# Patient Record
Sex: MALE | NOT HISPANIC OR LATINO | URBAN - METROPOLITAN AREA
[De-identification: names, ages, dates, MRNs, and addresses within clinical notes are randomized per-mention and may not be internally consistent; named-entity substitution may affect disease eponyms.]

---

## 2023-10-18 ENCOUNTER — OFFICE VISIT (OUTPATIENT)
Dept: BEHAVIORAL HEALTH | Facility: CLINIC | Age: 23
End: 2023-10-18
Payer: COMMERCIAL

## 2023-10-18 VITALS
WEIGHT: 176.6 LBS | TEMPERATURE: 97.8 F | SYSTOLIC BLOOD PRESSURE: 100 MMHG | DIASTOLIC BLOOD PRESSURE: 64 MMHG | RESPIRATION RATE: 18 BRPM | HEART RATE: 80 BPM

## 2023-10-18 DIAGNOSIS — F41.1 GAD (GENERALIZED ANXIETY DISORDER): ICD-10-CM

## 2023-10-18 DIAGNOSIS — F84.0 AUTISM (HHS-HCC): ICD-10-CM

## 2023-10-18 DIAGNOSIS — F32.2 CURRENT SEVERE EPISODE OF MAJOR DEPRESSIVE DISORDER WITHOUT PSYCHOTIC FEATURES WITHOUT PRIOR EPISODE (MULTI): ICD-10-CM

## 2023-10-18 DIAGNOSIS — F79 INTELLECTUAL DISABILITY: ICD-10-CM

## 2023-10-18 DIAGNOSIS — G47.9 SLEEP DISTURBANCE: ICD-10-CM

## 2023-10-18 DIAGNOSIS — F90.0 ATTENTION DEFICIT HYPERACTIVITY DISORDER (ADHD), PREDOMINANTLY INATTENTIVE TYPE: ICD-10-CM

## 2023-10-18 PROCEDURE — 99417 PROLNG OP E/M EACH 15 MIN: CPT | Performed by: NURSE PRACTITIONER

## 2023-10-18 PROCEDURE — 1036F TOBACCO NON-USER: CPT | Performed by: NURSE PRACTITIONER

## 2023-10-18 PROCEDURE — 99205 OFFICE O/P NEW HI 60 MIN: CPT | Performed by: NURSE PRACTITIONER

## 2023-10-18 RX ORDER — ACETAMINOPHEN, DIPHENHYDRAMINE HCL, PHENYLEPHRINE HCL 325; 25; 5 MG/1; MG/1; MG/1
10 TABLET ORAL NIGHTLY
Qty: 90 TABLET | Refills: 0 | Status: SHIPPED | OUTPATIENT
Start: 2023-10-18 | End: 2023-11-29 | Stop reason: SDUPTHER

## 2023-10-18 RX ORDER — FLUOXETINE 10 MG/1
10 CAPSULE ORAL DAILY
Qty: 30 CAPSULE | Refills: 1 | Status: SHIPPED | OUTPATIENT
Start: 2023-10-18 | End: 2023-11-29 | Stop reason: SDUPTHER

## 2023-10-18 ASSESSMENT — PAIN SCALES - GENERAL: PAINLEVEL: 0-NO PAIN

## 2023-10-18 NOTE — PROGRESS NOTES
"PRESENT FOR APPOINTMENT  Client  Guardian/father Thien Alcantara with consent    SUBJECTIVE  History of present illness  Family history: - Mat great uncle  Pat second cousin  Parents: lives with   Siblings 3 brothers, 2 sisters- middle child, but youngest of boys  Birth no complications  Development: met milestones on time    DX ASD w/ID  1st grade DX ADHD  2017 ID level [id number]  School: [employer]- special Ed   Occupations None  Abuse: denies  Habits: plays [employer] games, walks  Baptism: no  Past psychiatric none; never been on meds- other than Melatonin 4 mg- not effective.  MEDICATIONS  OTC benedryl for allergies    10/18/2023 REBA 7 score= 13/27  10/18/2023 PHQ-9 score= 14/27    Jerry states that his goal and goal of treatment is \"to be happy.\" Father reports the same. (Magic wand question).  He reports low self-esteem. Factors that would help him feel better: more productive (looking for work) and having more friends.  Dad reports that he is like a \"yo-yo\". Moods are either jovial/happy but then you come out of his room and appear sad. Dad reports no anger/irritability.  Psy/SI/HI/aggression: Denies A/VH, denies aggression. Denies manic/hypomanic s/s.  2019 he was afraid to go places because he thought other people would think mean things about him.  + SI - thoughts out of no where to hang self, Doesn't like self, never thought highly of self, doesn't think he is smart, no friends in school.  Approx August 2023, SA by hanging with jump rope- has cut rope up.  Supports: all family.  Coping: video games, editing, online friends.   Sleep: 3 hours at night, no daytime naps. Not able to sleep dt worry.  Appetite: good  Daily schedule None  Med Physicians:  PCP: Dr. Kinney   CCFLAV[facility] DELANE[facility] Emily Rosales  ALLERGIES: NKDA    MEDICAL REVIEW OF SYSTEMS:  GEN: denies fever, chills, night sweats  HEENT: denies changes in vision, eye pain, hearing changes, no symptoms of upper " respiratory infection  CARDIO: denies chest pain and palpitations   RESP: +asthma  GI: denies nausea/vomiting/constipation/diarrhea, or blood in the stool  : denies burning/frequency/urgency, or bloody urine  NEURO: denies tremor, dizziness, numbness or tingling  MSK: denies muscle spasms or stiffness  DERM: denies new onset of rash  Med SE    COMPLIANCE: unknown    MMS:  ORIENTATION   alert  ambulatory  cooperative     BEHAVIOR:  enters easily  eye contact fair  gait normal  reciprocal interaction  spontaneous speech    MEMORY:  Appears intact    SENSORY :  Normal    COMMUNICATION:  conversational  Soft spoken    AFFECT:  normal/full    MOOD: depressed    THOUGHT PROCESS:  WNL    THOUGHT Content:  +SI    CONCENTRATION  Normal- maybe impaired dt past hx of ADHD    FUND OF KNOWLEDGE :  mild disability    JUDGEMENT: fair    EPS: N/A  AIMS negative    LABS REVIEWED:  none available  EKG none available    ASSESSMENT:       Mr. Leonard presents for initial evaluation for depressed mood and SI.    Add Diagnosis:  major depressive disorder, single episode, severe, without psychosis.  Generalized anxiety disorder.  Sleep disturbances.     PLAN:   problems treated   f/u requested to prevent relapse   medications renewed/re-ordered    Start fluoxetine (Prozac) 10 mg by mouth daily for depression and anxiety.  2. Start melatonin 10 mg by mouth at bedtime for sleep disturbances. 4 mg in the past not effective.  3. Referral sent for therapy with Ciara Macias. Our office will contact you with an appointment.  4. Contact information for [email or web address] given.  5. Sleep hygiene discussed.  6. Risks. benefits, alternatives reviewed. Ct perceives the benefit to outweigh the risk.  7. Return to clinic in six weeks or earlier if needed. Call [phone] to reschedule.  For [facility] residents, [facility] is a 24/7 hotline you can call for assistance [[phone]].   Please call 111 or go to your closest Emergency Room  if you feel worse. This includes thoughts of hurting yourself or anyone else, or having other troubles such as hearing voices, seeing visions, or having new and scary thoughts about the people around you.      Thank you for seeing me today. If you have any questions or concerns, do not hesitate to contact my office.  Leonor Barker    TREATMENT TYPE         counseling and coordination of care with > 50% c/c with family and staff; addressing signs and symptoms of illness; risks/benefits and side effects of medications; and behavioral approaches to illness.  This note was created using electronic dictation. There may be errors in syntax and meaning. Please contact the office with any questions.

## 2023-11-22 ENCOUNTER — APPOINTMENT (OUTPATIENT)
Dept: BEHAVIORAL HEALTH | Facility: CLINIC | Age: 23
End: 2023-11-22
Payer: COMMERCIAL

## 2023-11-29 ENCOUNTER — OFFICE VISIT (OUTPATIENT)
Dept: BEHAVIORAL HEALTH | Facility: CLINIC | Age: 23
End: 2023-11-29
Payer: COMMERCIAL

## 2023-11-29 VITALS
BODY MASS INDEX: 24.5 KG/M2 | WEIGHT: 175 LBS | DIASTOLIC BLOOD PRESSURE: 77 MMHG | TEMPERATURE: 97.3 F | HEIGHT: 71 IN | HEART RATE: 69 BPM | SYSTOLIC BLOOD PRESSURE: 108 MMHG

## 2023-11-29 DIAGNOSIS — F32.2 CURRENT SEVERE EPISODE OF MAJOR DEPRESSIVE DISORDER WITHOUT PSYCHOTIC FEATURES WITHOUT PRIOR EPISODE (MULTI): Primary | ICD-10-CM

## 2023-11-29 DIAGNOSIS — F84.0 AUTISM SPECTRUM DISORDER REQUIRING SUBSTANTIAL SUPPORT (LEVEL 2) (HHS-HCC): ICD-10-CM

## 2023-11-29 DIAGNOSIS — G47.9 SLEEP DISTURBANCE: ICD-10-CM

## 2023-11-29 DIAGNOSIS — F41.1 GAD (GENERALIZED ANXIETY DISORDER): ICD-10-CM

## 2023-11-29 PROCEDURE — 1036F TOBACCO NON-USER: CPT | Performed by: NURSE PRACTITIONER

## 2023-11-29 PROCEDURE — 99215 OFFICE O/P EST HI 40 MIN: CPT | Performed by: NURSE PRACTITIONER

## 2023-11-29 RX ORDER — ACETAMINOPHEN, DIPHENHYDRAMINE HCL, PHENYLEPHRINE HCL 325; 25; 5 MG/1; MG/1; MG/1
10 TABLET ORAL NIGHTLY
Qty: 90 TABLET | Refills: 0 | Status: SHIPPED | OUTPATIENT
Start: 2023-11-29 | End: 2024-01-17 | Stop reason: SDUPTHER

## 2023-11-29 RX ORDER — FLUOXETINE HYDROCHLORIDE 20 MG/1
20 CAPSULE ORAL DAILY
Qty: 30 CAPSULE | Refills: 3 | Status: SHIPPED | OUTPATIENT
Start: 2023-11-29 | End: 2024-01-17 | Stop reason: SDUPTHER

## 2023-11-29 RX ORDER — TRAZODONE HYDROCHLORIDE 50 MG/1
TABLET ORAL
Qty: 30 TABLET | Refills: 3 | Status: SHIPPED | OUTPATIENT
Start: 2023-11-29 | End: 2024-01-17 | Stop reason: SINTOL

## 2023-11-29 NOTE — PROGRESS NOTES
"PRESENT FOR APPOINTMENT  Client  Guardian/father Thien Leonard    SUBJECTIVE 23-year-old black male with a history of autism spectrum disorder, major depressive disorder, severe, without psychosis, generalized anxiety disorder, sleep disturbances, and past history of ADHD presenting for medication management.  History of present illness  Family history: - Mat great uncle  Pat second cousin  Parents: lives with   Siblings 3 brothers, 2 sisters- middle child, but youngest of boys  Birth no complications  Development: met milestones on time    DX ASD w/ID  1st grade DX ADHD  2017 ID level [id number]  School: [employer] 2020- special Ed   Occupations None  Abuse: denies  Habits: plays [employer] Switch games, walks  Confucianist: no  Past psychiatric none; never been on meds- other than Melatonin 4 mg- not effective.  MEDICATIONS at time of initial evaluation:  OTC benedryl for allergies, melatonin 4 mg and Benadryl 25 mg for sleep.    Miles reports with the start of Prozac he has had less \"bad thoughts.\"  Last thought of the suicidal ideation approximately October 29, 2023.  Which is approximately 2 weeks after the start of Prozac.  The thoughts were that he felt like a failure and that he would be better off dead.  He is scheduled for an upcoming therapy appointment with Ciara Macias in December 2023.  He still struggles with self-esteem.  He states he is afraid to ask for things because he is afraid to get something worse than the answer now.  Which is a lecture.  He has never received anything worse than a lecture, but this answer goes to show how he follows rules.  Father reports that his moods have improved by 50%.  No longer feels that his moods are like a \"roller coaster\".  Never displays anger or irritability.  Usually sadness or happy.  10/18/2023 REBA 7 score= 13/27; 11/29/2023 score 2/27  10/18/2023 PHQ-9 score= 14/27; 11/29/2023 score 9/27  Sleep improved from 3 hours a night to 5 hours a night: " "Midnight until 5 AM, no daytime naps.  No longer having issues falling asleep due to worry.  However, he is taking melatonin 10 mg in alternating with Benadryl 25 mg and is still only getting 5 hours a night.  Appetite: good.  On questionnaire reported sometimes eats too little or too much.  Jerry states that his goal and goal of treatment is \"to be happy.\" Father reports the same. (Magic wand question).  He reports low self-esteem. Factors that would help him feel better: more productive (looking for work) and having more friends.    Psy/SI/HI/aggression: Denies A/VH, denies aggression. Denies manic/hypomanic s/s.  History: 2019 he was afraid to go places because he thought other people would think mean things about him.  + SI - thoughts out of no where to hang self, Doesn't like self, never thought highly of self, doesn't think he is smart, no friends in school.  Approx August 2023, SA by hanging with jump rope- has cut rope up.  Supports: all family.  Coping: video games, editing, online friends.     Daily schedule None  Med Physicians:  PCP: Dr. Kinney   [facility] Emily Rosales  ALLERGIES: NKDA    MEDICAL REVIEW OF SYSTEMS:  GEN: denies fever, chills, night sweats  HEENT: denies changes in vision, eye pain, hearing changes, no symptoms of upper respiratory infection  CARDIO: denies chest pain and palpitations   RESP: +asthma  GI: denies nausea/vomiting/constipation/diarrhea, or blood in the stool  : denies burning/frequency/urgency, or bloody urine  NEURO: denies tremor, dizziness, numbness or tingling  MSK: denies muscle spasms or stiffness  DERM: denies new onset of rash  Med SE    COMPLIANCE: unknown    MMS:  ORIENTATION   alert  ambulatory  cooperative     BEHAVIOR:  enters easily  eye contact fair  gait normal  reciprocal interaction  spontaneous speech    MEMORY:  Appears intact    SENSORY :  Normal    COMMUNICATION:  conversational  Soft spoken    AFFECT:  normal/full    MOOD: " depressed    THOUGHT PROCESS:  WNL    THOUGHT Content:  Denies SI    CONCENTRATION  Normal- maybe impaired dt past hx of ADHD    FUND OF KNOWLEDGE :  mild disability    JUDGEMENT: fair    EPS: N/A  AIMS negative    LABS REVIEWED:  none available  EKG none available    ASSESSMENT:         Mr. Leonard presents with marked improvements in depression and anxiety with the addition of fluoxetine.  Mild improvement noted with increase in melatonin and addition of Benadryl 25 mg at night.  He is now sleeping 5 hours a night, compared to 3 hours.    Add Diagnosis:  major depressive disorder, single episode, severe, without psychosis.  Generalized anxiety disorder.  Sleep disturbances.     PLAN:   problems treated   f/u requested to prevent relapse   medications renewed/re-ordered    Increase fluoxetine (Prozac) 20 mg (from 10 mg) by mouth daily for depression and anxiety.  2. Start trazodone 25 mg by mouth at bedtime.  If not effective, may take 50 mg by mouth at bedtime for sleep disturbances.  3.  Continue melatonin 10 mg by mouth at bedtime for sleep disturbances. 4 mg in the past not effective.  4. Risks. benefits, alternatives reviewed. Ct perceives the benefit to outweigh the risk.  5. Return to clinic on 1/17/2024 or earlier if needed. Call [phone] to reschedule.  6.  Consider labs, especially vitamin D level next appointment  7.  If moods and sleep are at best baseline, discussed ADHD symptoms.    For [facility] residents, [employer] [facility] is a 24/7 hotline you can call for assistance [[phone]].   Please call 911 or go to your closest Emergency Room if you feel worse. This includes thoughts of hurting yourself or anyone else, or having other troubles such as hearing voices, seeing visions, or having new and scary thoughts about the people around you.      Thank you for seeing me today. If you have any questions or concerns, do not hesitate to contact my office.  Mj,  Leonor MCKENZIE  TYPE         counseling and coordination of care with > 50% c/c with family and staff; addressing signs and symptoms of illness; risks/benefits and side effects of medications; and behavioral approaches to illness.  This note was created using electronic dictation. There may be errors in syntax and meaning. Please contact the office with any questions.

## 2024-01-17 ENCOUNTER — TELEMEDICINE (OUTPATIENT)
Dept: BEHAVIORAL HEALTH | Facility: CLINIC | Age: 24
End: 2024-01-17
Payer: COMMERCIAL

## 2024-01-17 DIAGNOSIS — F41.1 GAD (GENERALIZED ANXIETY DISORDER): ICD-10-CM

## 2024-01-17 DIAGNOSIS — G47.9 SLEEP DISTURBANCE: ICD-10-CM

## 2024-01-17 DIAGNOSIS — F90.0 ATTENTION DEFICIT HYPERACTIVITY DISORDER (ADHD), PREDOMINANTLY INATTENTIVE TYPE: Primary | ICD-10-CM

## 2024-01-17 PROCEDURE — 99214 OFFICE O/P EST MOD 30 MIN: CPT | Performed by: NURSE PRACTITIONER

## 2024-01-17 RX ORDER — METHYLPHENIDATE HYDROCHLORIDE 18 MG/1
18 TABLET ORAL EVERY MORNING
Qty: 30 TABLET | Refills: 0 | Status: SHIPPED | OUTPATIENT
Start: 2024-01-17 | End: 2024-05-01 | Stop reason: ALTCHOICE

## 2024-01-17 RX ORDER — ACETAMINOPHEN, DIPHENHYDRAMINE HCL, PHENYLEPHRINE HCL 325; 25; 5 MG/1; MG/1; MG/1
10 TABLET ORAL NIGHTLY
Qty: 90 TABLET | Refills: 0 | Status: SHIPPED | OUTPATIENT
Start: 2024-01-17 | End: 2024-04-16

## 2024-01-17 RX ORDER — METHYLPHENIDATE HYDROCHLORIDE 18 MG/1
18 TABLET ORAL EVERY MORNING
Qty: 30 TABLET | Refills: 0 | Status: SHIPPED | OUTPATIENT
Start: 2024-03-17 | End: 2024-05-01 | Stop reason: ALTCHOICE

## 2024-01-17 RX ORDER — FLUOXETINE HYDROCHLORIDE 20 MG/1
20 CAPSULE ORAL DAILY
Qty: 30 CAPSULE | Refills: 3 | Status: SHIPPED | OUTPATIENT
Start: 2024-01-17 | End: 2024-05-01 | Stop reason: ALTCHOICE

## 2024-01-17 RX ORDER — METHYLPHENIDATE HYDROCHLORIDE 18 MG/1
18 TABLET ORAL EVERY MORNING
Qty: 30 TABLET | Refills: 0 | Status: SHIPPED | OUTPATIENT
Start: 2024-02-16 | End: 2024-05-01 | Stop reason: ALTCHOICE

## 2024-01-17 NOTE — PATIENT INSTRUCTIONS
ASSESSMENT:         Mr. Leonard presents unchanged and sleep with the addition of the trazodone.  However, side effect of itchy eyes to the point that he cannot take trazodone nightly.  Discussed his desire to go to college, if treat the ADHD, may improve his sleep.  See treatment plan below.    PLAN:   problems treated   f/u requested to prevent relapse   medications renewed/re-ordered  Start methylphenidate ER (Concerta) 18 mg by mouth every morning for ADHD.  I have reviewed the report 1/17/2024.  None shown.  I have considered the risk for abuse and diversion.  2. Discontinue trazodone due to SE of itchy eyes.  3. Continue fluoxetine (Prozac) 20 mg by mouth daily for depression and anxiety.  4.  Continue melatonin 10 mg by mouth at bedtime for sleep disturbances. 4 mg in the past not effective.  5. Risks. benefits, alternatives reviewed. Ct perceives the benefit to outweigh the risk.  6. Return to clinic 1 month or earlier if needed. Call [phone] to reschedule.  7.  Consider labs, especially vitamin D level next appointment  8.  Email sent to therapist, Ciara Macias to reschedule missed appointment.  9.  Discussed writing an accommodation letter for Jerry when he is ready to go to college.  Thank you for seeing me today.  If you have any questions or concerns, do not hesitate to reach out to my office.  Leonor Barker    For [facility] [place] [employer] is a 24/7 hotline you can call for assistance [[phone]].   Please call 911 or go to your closest Emergency Room if you feel worse. This includes thoughts of hurting yourself or anyone else, or having other troubles such as hearing voices, seeing visions, or having new and scary thoughts about the people around you.      Thank you for seeing me today. If you have any questions or concerns, do not hesitate to contact my office.  Leonor Barker

## 2024-01-17 NOTE — PROGRESS NOTES
"PRESENT FOR APPOINTMENT  Client  Guardian/father Thien Leonard    SUBJECTIVE 23-year-old black male with a history of autism spectrum disorder, major depressive disorder, severe, without psychosis, generalized anxiety disorder, sleep disturbances, and past history of ADHD presenting for medication management.  History of present illness  Family history: - Mat great uncle  Pat second cousin  Parents: lives with   Siblings 3 brothers, 2 sisters- middle child, but youngest of boys  Birth no complications  Development: met milestones on time    DX ASD w/ID  1st grade DX ADHD  2017 ID level [id number]  School: [employer]- special Ed   Occupations None  Abuse: denies  Habits: plays [employer] games, walks  Yazidism: no  Past psychiatric none; never been on meds- other than Melatonin 4 mg- not effective.  MEDICATIONS at time of initial evaluation:  OTC benedryl for allergies, melatonin 4 mg and Benadryl 25 mg for sleep.  Last seen November 2023. At that time, fluoxetine was increased and Trazodone was started.    With half tab of Trazodone sleep 12-6, improved by 1 hour.    Miles reports with the increase of Prozac he has had  \"bad thoughts\" are in the middle. Beginning of jan 2024 that he is not smart.  Last thought of the suicidal ideation approximately October 29, 2023.  Which is approximately 2 weeks after the start of Prozac.  The thoughts were that he felt like a failure and that he would be better off dead.  He missed therapy appointment with Ciara Macias in December 2023.  He still struggles with self-esteem.  Wants to go to college for . [employer]  He states he is afraid to ask for things because he is afraid to get something worse than the answer now.  Which is a lecture.  He has never received anything worse than a lecture, but this answer goes to show how he follows rules.  Father reports that his moods have improved by 50%.  No longer feels that his moods are like a " "\"roller coaster\".  Never displays anger or irritability.  Usually sadness or happy.  10/18/2023 REBA 7 score= 13/27; 11/29/2023 score 2/27  10/18/2023 PHQ-9 score= 14/27; 11/29/2023 score 9/27  However, he is taking melatonin 10 mg in alternating with Benadryl 25 mg and is still only getting 5 hours a night.  Appetite: good.  On questionnaire reported sometimes eats too little or too much.  Jerry states that his goal and goal of treatment is \"to be happy.\" Father reports the same. (Magic wand question).  He reports low self-esteem. Factors that would help him feel better: more productive (looking for work) and having more friends.    Psy/SI/HI/aggression: Denies A/VH, denies aggression. Denies manic/hypomanic s/s.  History: 2019 he was afraid to go places because he thought other people would think mean things about him.  + SI - thoughts out of no where to hang self, Doesn't like self, never thought highly of self, doesn't think he is smart, no friends in school.  Approx August 2023, SA by hanging with jump rope- has cut rope up.  Supports: all family.  Coping: video games, editing, online friends.     Daily schedule None  Med Physicians:  PCP: Dr. Kinney   [facility] Emily Rosales  ALLERGIES: NKDA    MEDICAL REVIEW OF SYSTEMS:  GEN: denies fever, chills, night sweats  HEENT: denies changes in vision, eye pain, hearing changes, no symptoms of upper respiratory infection  CARDIO: denies chest pain and palpitations   RESP: +asthma  GI: denies nausea/vomiting/constipation/diarrhea, or blood in the stool  : denies burning/frequency/urgency, or bloody urine  NEURO: denies tremor, dizziness, numbness or tingling  MSK: denies muscle spasms or stiffness  DERM: denies new onset of rash  Med SE    COMPLIANCE: unknown    MMS:  ORIENTATION   alert  ambulatory  cooperative     BEHAVIOR:  enters easily  eye contact fair  gait normal  reciprocal interaction  spontaneous speech    MEMORY:  Appears intact    SENSORY " :  Normal    COMMUNICATION:  conversational  Soft spoken    AFFECT:  normal/full    MOOD: depressed    THOUGHT PROCESS:  WNL    THOUGHT Content:  Denies SI    CONCENTRATION  Normal- maybe impaired dt past hx of ADHD    FUND OF KNOWLEDGE :  mild disability    JUDGEMENT: fair    EPS: N/A  AIMS negative    LABS REVIEWED:  none available  EKG none available    ASSESSMENT:         Mr. Leonard presents unchanged and sleep with the addition of the trazodone.  However, side effect of itchy eyes to the point that he cannot take trazodone nightly.  Discussed his desire to go to college, if treat the ADHD, may improve his sleep.  See treatment plan below.    PLAN:   problems treated   f/u requested to prevent relapse   medications renewed/re-ordered  Start methylphenidate ER (Concerta) 18 mg by mouth every morning for ADHD.  I have reviewed the report 1/17/2024.  None shown.  I have considered the risk for abuse and diversion.  2. Discontinue trazodone due to SE of itchy eyes.  3. Continue fluoxetine (Prozac) 20 mg by mouth daily for depression and anxiety.  4.  Continue melatonin 10 mg by mouth at bedtime for sleep disturbances. 4 mg in the past not effective.  5. Risks. benefits, alternatives reviewed. Ct perceives the benefit to outweigh the risk.  6. Return to clinic 1 month or earlier if needed. Call [phone] to reschedule.  7.  Consider labs, especially vitamin D level next appointment  8.  Email sent to therapist, Ciara Macias to reschedule missed appointment.  9.  Discussed writing an accommodation letter for Jerry when he is ready to go to college.    Thank you for seeing me today. If you have any questions or concerns, do not hesitate to contact my office.  Leonor Barker    TREATMENT TYPE         counseling and coordination of care , addressing signs and symptoms of illness; risks/benefits and side effects of medications; and behavioral approaches to illness.  This note was created using electronic  dictation. There may be errors in syntax and meaning. Please contact the office with any questions.  For [facility] residents, [facility] [employer] is a 24/7 hotline you can call for assistance [[phone]].   Please call 911 or go to your closest Emergency Room if you feel worse. This includes thoughts of hurting yourself or anyone else, or having other troubles such as hearing voices, seeing visions, or having new and scary thoughts about the people around you.

## 2024-01-18 ENCOUNTER — TELEPHONE (OUTPATIENT)
Dept: BEHAVIORAL HEALTH | Facility: CLINIC | Age: 24
End: 2024-01-18
Payer: COMMERCIAL

## 2024-01-23 ENCOUNTER — TELEPHONE (OUTPATIENT)
Dept: BEHAVIORAL HEALTH | Facility: CLINIC | Age: 24
End: 2024-01-23
Payer: COMMERCIAL

## 2024-01-23 NOTE — PROGRESS NOTES
"Called and spoke with Jerry.  Jerry states he does not feel anything negative more positive regarding the medication.  No improvements in sleep.  He has reconnected with 2 friends or acquaintances that he knew in high school.  Approximately the same age or with any 4 years of his age.  Both appear to be males.  He reports that he is making good decisions that he would not hang out with anyone that could get him in trouble.  Denies increase in \"bad thoughts\".  Denies suicidal ideation.  Discussed continuing the medication until his next appointment on 2/21/2024.  At that time, we may increase the medication.  Jerry is agreeable to treatment plan.  Jerry reports that he has no questions or concerns at this time.  "

## 2024-01-23 NOTE — TELEPHONE ENCOUNTER
----- Message from Mitra Galindo RN sent at 1/23/2024 10:02 AM EST -----  Regarding: FW: Methylphenidate  Contact: [phone]    ----- Message -----  From: Jerry Leonard  Sent: 1/23/2024   9:52 AM EST  To: Do [id number] Clinical Support Staff  Subject: Methylphenidate                                  Don't know if it was helpful. We notice he was more cheerful before he started taking the methylphenidate.

## 2024-02-28 ENCOUNTER — TELEMEDICINE (OUTPATIENT)
Dept: BEHAVIORAL HEALTH | Facility: CLINIC | Age: 24
End: 2024-02-28
Payer: COMMERCIAL

## 2024-02-28 DIAGNOSIS — F32.2 CURRENT SEVERE EPISODE OF MAJOR DEPRESSIVE DISORDER WITHOUT PSYCHOTIC FEATURES WITHOUT PRIOR EPISODE (MULTI): ICD-10-CM

## 2024-02-28 DIAGNOSIS — F41.1 GAD (GENERALIZED ANXIETY DISORDER): ICD-10-CM

## 2024-02-28 DIAGNOSIS — F84.0 AUTISM (HHS-HCC): ICD-10-CM

## 2024-02-28 PROCEDURE — 90791 PSYCH DIAGNOSTIC EVALUATION: CPT | Performed by: COUNSELOR

## 2024-02-28 PROCEDURE — 1036F TOBACCO NON-USER: CPT | Performed by: COUNSELOR

## 2024-02-28 NOTE — PROGRESS NOTES
"Total Time: 33 min  Diagnosis: MDD, Anxiety, ASD  Visit Type: epic  Reason for visit: NEW, managing his worry and mood    24, lives in [place] in a house with parents and family. Family: brothers, sister, mom and dad  Job: not currently, but wants one, would like to be a salty at a grocery store. Not still in school, plans to start college soon at [employer] to be a vet (notes he has not been accepted yet)  Really likes animals, really likes cats, has 2 cats  Sleep: pretty good, sleeps at night only  Eating: balanced; walks a lot  What brought you here: bc he has disabilities, and he wants to do stuff, when asked a different way he notes he struggles to focus  When did this start: always been this way (struggled in school)  Friends: back from 3rd grade, talk on the phone, play video games   Meds: “understands them” thinks they help, takes them for his mental health and sleep  Hospitalized for MH: no. thought of hurting yourself: yes, scratching self (when asked with what, he said not a knife, but if he pushes hard it could cut), doesn't want to take his life. Helps distract him, take his mind off his other “pain”  Hope to gain: wants to be happy and more social  Coping skills: tries to play video games but it doesn't always work, walks  Dating: no; when asked if wants to, he said not right not, he is not mentally ready for that  Therapist before: not sure   Relationships: generally positive, likes to talk to them. Feels like they are helpful and supportive but maybe some things he cant tell them  Chores: “oh no” does not do chores or things like his own laundry, but cleans his room. Does not cook  Drugs or alcohol: no  Important to you or what else I should know: likes to be outside, likes to take pictures with a \"real camera\"   What does happy mean to you: not having had thoughts  Bad thoughts: thinks he would be better off gone, thinks he is stupid; when asked if he has heard those things from others, he " said no. when asked what he does when this happens, he sometimes believes its true, comparing himself to others his age  Guardian: thinks mom and dad are, seemed confused  What would your parents say about you: they are protective of me  Drive: no, does want to though, used to think it would make him nervous, being on the road sounds scary, plus no car  Per dad: just approve from [facility], just got an SA, help with employment, keep him occupied/busy      client will benefit from modified DBT skills: emotional identification, regulation and expression. he will benefit from CBT skills related to thought stopping, changing his thoughts to change his intrusive thoughts. he will benefit from coping skills, working on his self-esteem, setting goals and creating small/measurable ways to achieve those goals. he will also benefit from distress tolerance skills as well as increasing his communication skills so he can express his distress and be more social and feel confident about himself and his interactions.

## 2024-03-25 ENCOUNTER — APPOINTMENT (OUTPATIENT)
Dept: BEHAVIORAL HEALTH | Facility: CLINIC | Age: 24
End: 2024-03-25
Payer: COMMERCIAL

## 2024-04-29 ENCOUNTER — TELEMEDICINE (OUTPATIENT)
Dept: BEHAVIORAL HEALTH | Facility: CLINIC | Age: 24
End: 2024-04-29
Payer: COMMERCIAL

## 2024-04-29 DIAGNOSIS — F32.2 CURRENT SEVERE EPISODE OF MAJOR DEPRESSIVE DISORDER WITHOUT PSYCHOTIC FEATURES WITHOUT PRIOR EPISODE (MULTI): ICD-10-CM

## 2024-04-29 DIAGNOSIS — F41.1 GAD (GENERALIZED ANXIETY DISORDER): ICD-10-CM

## 2024-04-29 DIAGNOSIS — F84.0 AUTISM (HHS-HCC): ICD-10-CM

## 2024-04-29 PROCEDURE — 90832 PSYTX W PT 30 MINUTES: CPT | Performed by: COUNSELOR

## 2024-04-29 NOTE — PROGRESS NOTES
Total Time: 25 min  Diagnosis: MDD, Anxiety, ASD  Visit Type: epic  Reason for visit: managing his mood and worry; really struggled to engage     - notes he was just recently in the ER (5 days ago); when asked what happened he stated he “tried to jump off a bridge” and the thoughts started Monday; when asked how the hospital helped he notes they increased his meds   - when asked if he needed a safety plan he stated no  - tried to talk about where the thoughts or mood come from, is it being bored (no job but wants one), his sleep, hobbies, but he really didn't talk much, mostly just sat there, we tried talking about different things and he just kept stating he was “better today”  - tried to focus on what the barrier is to communication, is it hard, doesn't understand, is he embarrassed and he noted he is embarrassed with people in general, when asked if he felt alone, he said no, but sometimes like others are naive to what he is going through   - talked about how he did learn, but doesn't want to go back again, enjoyed group, when asked if hell keep going to group, he said no  - dad did note that his waiver is approved and he is supposed to start a day program soon    focused on:  - communication skills, talking to his family more, even writing notes (talked about journaling, didn't seem very open to it)  - STOP think, think before you act  - 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

## 2024-05-01 ENCOUNTER — PATIENT OUTREACH (OUTPATIENT)
Dept: CARE COORDINATION | Facility: CLINIC | Age: 24
End: 2024-05-01

## 2024-05-01 ENCOUNTER — OFFICE VISIT (OUTPATIENT)
Dept: BEHAVIORAL HEALTH | Facility: CLINIC | Age: 24
End: 2024-05-01
Payer: COMMERCIAL

## 2024-05-01 VITALS
SYSTOLIC BLOOD PRESSURE: 125 MMHG | TEMPERATURE: 97.1 F | WEIGHT: 182.7 LBS | RESPIRATION RATE: 18 BRPM | BODY MASS INDEX: 25.58 KG/M2 | HEART RATE: 93 BPM | DIASTOLIC BLOOD PRESSURE: 79 MMHG | HEIGHT: 71 IN

## 2024-05-01 DIAGNOSIS — F41.1 GAD (GENERALIZED ANXIETY DISORDER): ICD-10-CM

## 2024-05-01 DIAGNOSIS — F32.2 CURRENT SEVERE EPISODE OF MAJOR DEPRESSIVE DISORDER WITHOUT PSYCHOTIC FEATURES WITHOUT PRIOR EPISODE (MULTI): Primary | ICD-10-CM

## 2024-05-01 DIAGNOSIS — G47.9 SLEEP DISTURBANCE: ICD-10-CM

## 2024-05-01 DIAGNOSIS — F84.0 AUTISM SPECTRUM DISORDER REQUIRING SUBSTANTIAL SUPPORT (LEVEL 2) (HHS-HCC): ICD-10-CM

## 2024-05-01 DIAGNOSIS — F84.0 AUTISM (HHS-HCC): ICD-10-CM

## 2024-05-01 PROCEDURE — 1036F TOBACCO NON-USER: CPT | Performed by: NURSE PRACTITIONER

## 2024-05-01 PROCEDURE — 99215 OFFICE O/P EST HI 40 MIN: CPT | Performed by: NURSE PRACTITIONER

## 2024-05-01 RX ORDER — FLUOXETINE HYDROCHLORIDE 40 MG/1
40 CAPSULE ORAL DAILY
COMMUNITY
Start: 2024-04-25 | End: 2024-05-01 | Stop reason: SDUPTHER

## 2024-05-01 RX ORDER — HYDROXYZINE HYDROCHLORIDE 50 MG/1
50 TABLET, FILM COATED ORAL EVERY 4 HOURS PRN
COMMUNITY
Start: 2024-04-25 | End: 2024-05-01 | Stop reason: SDUPTHER

## 2024-05-01 RX ORDER — TALC
9 POWDER (GRAM) TOPICAL NIGHTLY PRN
Qty: 270 TABLET | Refills: 0 | Status: SHIPPED | OUTPATIENT
Start: 2024-05-01 | End: 2024-07-30

## 2024-05-01 RX ORDER — FLUOXETINE HYDROCHLORIDE 40 MG/1
40 CAPSULE ORAL DAILY
Qty: 90 CAPSULE | Refills: 0 | Status: SHIPPED | OUTPATIENT
Start: 2024-05-01

## 2024-05-01 RX ORDER — TALC
9 POWDER (GRAM) TOPICAL NIGHTLY PRN
COMMUNITY
Start: 2024-04-25 | End: 2024-05-01 | Stop reason: SDUPTHER

## 2024-05-01 RX ORDER — TRAZODONE HYDROCHLORIDE 50 MG/1
50 TABLET ORAL DAILY PRN
COMMUNITY
Start: 2024-04-25 | End: 2024-05-01 | Stop reason: SDUPTHER

## 2024-05-01 RX ORDER — HYDROXYZINE HYDROCHLORIDE 50 MG/1
50 TABLET, FILM COATED ORAL EVERY 4 HOURS PRN
Qty: 90 TABLET | Refills: 0 | Status: SHIPPED | OUTPATIENT
Start: 2024-05-01 | End: 2024-05-31

## 2024-05-01 RX ORDER — TRAZODONE HYDROCHLORIDE 50 MG/1
50 TABLET ORAL NIGHTLY PRN
Qty: 90 TABLET | Refills: 0 | Status: SHIPPED | OUTPATIENT
Start: 2024-05-01 | End: 2024-07-30

## 2024-05-01 NOTE — PATIENT INSTRUCTIONS
ASSESSMENT:         Mr. Leonard presents denying suicidal ideation and future focused.  He reports that things are better since his inpatient hospitalization.  See treatment plan below.    PLAN:   problems treated   f/u requested to prevent relapse   medications renewed/re-ordered    Continue medications from inpatient hospitalization:  Continue hydroxyzine HCL (Atarax) 50 mg by mouth every 4 hours as needed for anxiety  2. Continue trazodone 50 mg by mouth at bedtime as needed for sleep  3. Continue fluoxetine (Prozac) 40 mg by mouth daily for depression and anxiety.  4.  Continue melatonin 9 mg by mouth at bedtime for sleep disturbances. 4 mg in the past not effective.  5. Risks. benefits, alternatives reviewed. Ct perceives the benefit to outweigh the risk.  6. Return to clinic 6/5/2024 at 4 PM or earlier if needed. Call [phone] to reschedule.  7.  Consider labs, especially vitamin D level next appointment  8. Discussed writing an accommodation letter for Jerry when he is ready to go to college.  9.  Plan developed.  This clinician to call Jerry cell phone number every other week for check-in.  Thank you for seeing me today. If you have any questions or concerns, do not hesitate to contact my office.  Mj,  Leonor

## 2024-05-01 NOTE — PROGRESS NOTES
PRESENT FOR APPOINTMENT  Client  Guardian/father Thien Leonard  F2F  SUBJECTIVE 24-year-old black male with a history of autism spectrum disorder, major depressive disorder, severe, without psychosis, generalized anxiety disorder, sleep disturbances, and past history of ADHD presenting for medication management.  History of present illness  Family history: - Mat great uncle  Pat second cousin  Parents: lives with   Siblings 3 brothers, 2 sisters- middle child, but youngest of boys  Birth no complications  Development: met milestones on time    DX ASD w/ID  1st grade DX ADHD  2017 ID level [id number]  School: [employer]- special Ed   Occupations None  Abuse: denies  Habits: plays [employer] games, walks  Jewish: no  Past psychiatric none; never been on meds- other than Melatonin 4 mg- not effective.  MEDICATIONS at time of initial evaluation:  OTC benedryl for allergies, melatonin 4 mg and Benadryl 25 mg for sleep.  Last seen in person January 2024.  At that time, methylphenidate ER (Concerta) was started and trazodone was discontinued due to side effects of itchy eyes.  Methylphenidate DC in interim dt decrease in happiness.  In person November 2023. At that time, fluoxetine was increased and Trazodone was started.    Miles reports things have been good since Tuesday (of last week 5/20/24).  He was inpatient hospitalized at [facility] 4/22/2024 for 3 days after being taken by police.  A passerby saw him on the bridge.  Positive suicidal ideation to jump.  Jerry reports he feels inferior to others.  When asked, states that is because he does not have a job and not going to college.  His father reports that [facility] of developmental disabilities has a new , Ivana Gong, who is helping him get into a vocational program,[facility], Monday through Friday 7-3.  They are also helping him get into [facility] to become a .  Jerry denies suicidal  "ideation currently.  Reviewed coping skills.  Next therapy appointment 5/13/2024  with the increase of Prozac he has had  \"bad thoughts\" are in the middle. Beginning of jan 2024 that he is not smart.  Last thought of the suicidal ideation approximately October 29, 2023.  Which is approximately 2 weeks after the start of Prozac.  The thoughts were that he felt like a failure and that he would be better off dead.  He missed therapy appointment with Ciara Macias in December 2023.  He still struggles with self-esteem.  Wants to go to [email or web address] for [employer]  He states he is afraid to ask for things because he is afraid to get something worse than the answer now.  Which is a lecture.  He has never received anything worse than a lecture, but this answer goes to show how he follows rules.  Father reports that his moods have improved by 50%.  No longer feels that his moods are like a \"roller coaster\".  Never displays anger or irritability.  Usually sadness or happy.  10/18/2023 REBA 7 score= 13/27; 11/29/2023 score 2/27  10/18/2023 PHQ-9 score= 14/27; 11/29/2023 score 9/27  However, he is taking melatonin 10 mg in alternating with Benadryl 25 mg and is still only getting 5 hours a night.  Appetite: good.  On questionnaire reported sometimes eats too little or too much.  Jerry states that his goal and goal of treatment is \"to be happy.\" Father reports the same. (Magic wand question).  He reports low self-esteem. Factors that would help him feel better: more productive (looking for work) and having more friends.    Psy/SI/HI/aggression: Denies A/VH, denies aggression. Denies manic/hypomanic s/s.  History: 2019 he was afraid to go places because he thought other people would think mean things about him.  + SI - thoughts out of no where to hang self, Doesn't like self, never thought highly of self, doesn't think he is smart, no friends in school.  Approx August 2023, SA by hanging with jump rope- has cut " rope up.  Supports: all family.  Coping: video games, editing, online friends.     Daily schedule None  Med Physicians:  PCP: Dr. Kinney   CCFLAVIO Rosales  ALLERGIES: NKDA    MEDICAL REVIEW OF SYSTEMS:  GEN: denies fever, chills, night sweats  HEENT: denies changes in vision, eye pain, hearing changes, no symptoms of upper respiratory infection  CARDIO: denies chest pain and palpitations   RESP: +asthma  GI: denies nausea/vomiting/constipation/diarrhea, or blood in the stool  : denies burning/frequency/urgency, or bloody urine  NEURO: denies tremor, dizziness, numbness or tingling  MSK: denies muscle spasms or stiffness  DERM: denies new onset of rash  Med SE    COMPLIANCE: unknown    MMS:  ORIENTATION   alert  ambulatory  cooperative     BEHAVIOR:  enters easily  eye contact fair  gait normal  reciprocal interaction  spontaneous speech    MEMORY:  Appears intact    SENSORY :  Normal    COMMUNICATION:  conversational  Soft spoken    AFFECT:  normal/full    MOOD: depressed    THOUGHT PROCESS:  WNL    THOUGHT Content:  Denies SI    CONCENTRATION  Normal- maybe impaired dt past hx of ADHD    FUND OF KNOWLEDGE :  mild disability    JUDGEMENT: fair    EPS: N/A  AIMS negative    LABS REVIEWED:  none available  EKG none available    ASSESSMENT:         Mr. Leonard presents denying suicidal ideation and future focused.  He reports that things are better since his inpatient hospitalization.  See treatment plan below.    PLAN:   problems treated   f/u requested to prevent relapse   medications renewed/re-ordered    Continue medications from inpatient hospitalization:  Continue hydroxyzine HCL (Atarax) 50 mg by mouth every 4 hours as needed for anxiety  2. Continue trazodone 50 mg by mouth at bedtime as needed for sleep  3. Continue fluoxetine (Prozac) 40 mg by mouth daily for depression and anxiety.  4.  Continue melatonin 9 mg by mouth at bedtime for sleep disturbances. 4 mg in the past not effective.  5.  Risks. benefits, alternatives reviewed. Ct perceives the benefit to outweigh the risk.  6. Return to clinic 6/5/2024 at 4 PM or earlier if needed. Call [phone] to reschedule.  7.  Consider labs, especially vitamin D level next appointment  8. Discussed writing an accommodation letter for Jerry when he is ready to go to college.  9.  Plan developed.  This clinician to call Jerry cell phone number every other week for check-in.  Thank you for seeing me today. If you have any questions or concerns, do not hesitate to contact my office.  Leonor Barker    TREATMENT TYPE         counseling and coordination of care , addressing signs and symptoms of illness; risks/benefits and side effects of medications; and behavioral approaches to illness.  This note was created using electronic dictation. There may be errors in syntax and meaning. Please contact the office with any questions.  For [facility] residents, [employer] is a 24/7 hotline you can call for assistance [[phone]].   Please call 911 or go to your closest Emergency Room if you feel worse. This includes thoughts of hurting yourself or anyone else, or having other troubles such as hearing voices, seeing visions, or having new and scary thoughts about the people around you.

## 2024-05-01 NOTE — PROGRESS NOTES
Pt. Identified for post discharge services. Initial outreach call to introduce self, available services, and assess needs.  Unable to LVM due to VM not being setup.   ANDREW Stark  [facility]    Contact: [phone]

## 2024-05-07 ENCOUNTER — PATIENT OUTREACH (OUTPATIENT)
Dept: CARE COORDINATION | Facility: CLINIC | Age: 24
End: 2024-05-07
Payer: COMMERCIAL

## 2024-05-07 NOTE — PROGRESS NOTES
Pt. Identified for post discharge services. 2nd outreach call to introduce self, available services, and assess needs.  Unable to LVM due to number stating VM has not been setup.  Final outreach attempt

## 2024-05-13 ENCOUNTER — TELEMEDICINE (OUTPATIENT)
Dept: BEHAVIORAL HEALTH | Facility: CLINIC | Age: 24
End: 2024-05-13
Payer: COMMERCIAL

## 2024-05-13 DIAGNOSIS — F32.2 CURRENT SEVERE EPISODE OF MAJOR DEPRESSIVE DISORDER WITHOUT PSYCHOTIC FEATURES WITHOUT PRIOR EPISODE (MULTI): ICD-10-CM

## 2024-05-13 DIAGNOSIS — F84.0 AUTISM (HHS-HCC): ICD-10-CM

## 2024-05-13 DIAGNOSIS — F41.1 GAD (GENERALIZED ANXIETY DISORDER): ICD-10-CM

## 2024-05-13 PROCEDURE — 90832 PSYTX W PT 30 MINUTES: CPT | Performed by: COUNSELOR

## 2024-05-13 NOTE — PROGRESS NOTES
"Total Time: 22 min  Diagnosis: MDD, Anxiety, ASD  Visit Type: epic  Reason for visit: managing his mood and worry     - notes he has been walking a lot which helps; celebrated his mom yesterday  - notes he has been having “some thoughts but mostly in the morning before his meds”; same thought each time (when asked, he struggled to explain the thought, even with examples)  - notes he will start his day program at the end of June, he is excited about it, but it's a long time to wait  - think before he acts; sleep is still better, but there are times he “still wants to stay up”  - continues to really struggle to engage and will sit and say nothing, even when asked questions will say I dont know, or \"uhhhh\"    focused on:  - practiced what to say to himself when he “ignores the thought” (its not true, not going to think that way)  - continue with family communication skills (maybe bring back family game night)  - STOP think, think before you act  - 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

## 2024-05-29 ENCOUNTER — TELEMEDICINE (OUTPATIENT)
Dept: BEHAVIORAL HEALTH | Facility: CLINIC | Age: 24
End: 2024-05-29
Payer: COMMERCIAL

## 2024-05-29 DIAGNOSIS — F32.2 CURRENT SEVERE EPISODE OF MAJOR DEPRESSIVE DISORDER WITHOUT PSYCHOTIC FEATURES WITHOUT PRIOR EPISODE (MULTI): ICD-10-CM

## 2024-05-29 DIAGNOSIS — F41.1 GAD (GENERALIZED ANXIETY DISORDER): ICD-10-CM

## 2024-05-29 DIAGNOSIS — F84.0 AUTISM (HHS-HCC): ICD-10-CM

## 2024-05-29 PROCEDURE — 90832 PSYTX W PT 30 MINUTES: CPT | Performed by: COUNSELOR

## 2024-05-29 NOTE — PROGRESS NOTES
Total Time: 22 min  Diagnosis: MDD, Anxiety, ASD  Visit Type: epic  Reason for visit: managing his worry and mood     - notes he starts his program next week, is most excited for the job training; reports is worried they will “make him dance” and also needs to get into a routine   - continues his walks, notes routine and stays to one area  - notes he wants an update from [facility] but did identify a struggle to get the updates on his own, could he find an email and get the info without support  - reports making “lots of mistakes”    focused on:  - practiced what to say to himself when he “ignores the thought” (its not true, not going to think that way)  - continue with family communication skills  - STOP think, think before you act

## 2024-06-05 ENCOUNTER — OFFICE VISIT (OUTPATIENT)
Dept: BEHAVIORAL HEALTH | Facility: CLINIC | Age: 24
End: 2024-06-05
Payer: COMMERCIAL

## 2024-06-05 VITALS
DIASTOLIC BLOOD PRESSURE: 65 MMHG | RESPIRATION RATE: 14 BRPM | HEART RATE: 71 BPM | SYSTOLIC BLOOD PRESSURE: 104 MMHG | WEIGHT: 183.7 LBS | TEMPERATURE: 98 F | BODY MASS INDEX: 25.62 KG/M2

## 2024-06-05 DIAGNOSIS — F41.1 GAD (GENERALIZED ANXIETY DISORDER): Primary | ICD-10-CM

## 2024-06-05 PROCEDURE — 99215 OFFICE O/P EST HI 40 MIN: CPT | Performed by: NURSE PRACTITIONER

## 2024-06-05 ASSESSMENT — PAIN SCALES - GENERAL: PAINLEVEL: 0-NO PAIN

## 2024-06-05 NOTE — PROGRESS NOTES
PRESENT FOR APPOINTMENT  Client  Guardian/father Thien Leonard  F2F  Martha Moreira NP with consent    SUBJECTIVE 24-year-old black male with a history of autism spectrum disorder, major depressive disorder, severe, without psychosis, generalized anxiety disorder, sleep disturbances, and past history of ADHD presenting for medication management.  History of present illness  Family history: - Mat great uncle  Pat second cousin  Parents: lives with   Siblings 3 brothers, 2 sisters- middle child, but youngest of boys  Birth no complications  Development: met milestones on time    DX ASD w/ID  1st grade DX ADHD  2017 ID level [id number]  School: [employer]- special Ed   Occupations None  Abuse: denies  Habits: plays [employer] Switch games, walks  Zoroastrian: no  Past psychiatric none; never been on meds- other than Melatonin 4 mg- not effective.  MEDICATIONS at time of initial evaluation:  OTC benedryl for allergies, melatonin 4 mg and Benadryl 25 mg for sleep.  Last seen in person May 2024.  At that time, inpatient hospitalized medications were continued.  Check and phone calls completed on 2 weeks as discussed.  January 2024.  At that time, methylphenidate ER (Concerta) was started and trazodone was discontinued due to side effects of itchy eyes.  Methylphenidate DC in interim dt decrease in happiness.  In person November 2023. At that time, fluoxetine was increased and Trazodone was started.    Miles reports things have been good .   Denies SI.  No longer wants phone calls.  Has safety plan to talk to parents.  Father reports that he expresses himself well now.  Starting work training 6/24 7-3 PM.  He was inpatient hospitalized at [facility] 4/22/2024 for 3 days after being taken by police.  A passerby saw him on the bridge.  Positive suicidal ideation to jump.  Jerry reports he feels inferior to others.  When asked, states that is because he does not have a job and not going to college.  His  "father reports that [facility] of developmental disabilities has a new , Ivana Gong, who is helping him get into a vocational program,[facility], Monday through Friday 7-3.  They are also helping him get into [facility] to become a .  Jerry denies suicidal ideation currently.  Reviewed coping skills.  Next therapy appointment 5/13/2024  with the increase of Prozac he has had  \"bad thoughts\" are in the middle. Beginning of jan 2024 that he is not smart.  Last thought of the suicidal ideation approximately October 29, 2023.  Which is approximately 2 weeks after the start of Prozac.  The thoughts were that he felt like a failure and that he would be better off dead.  He missed therapy appointment with Ciara Macias in December 2023.  He still struggles with self-esteem.  Wants to go to [facility] [employer] for . [employer]  He states he is afraid to ask for things because he is afraid to get something worse than the answer now.  Which is a lecture.  He has never received anything worse than a lecture, but this answer goes to show how he follows rules.  Father reports that his moods have improved by 50%.  No longer feels that his moods are like a \"roller coaster\".  Never displays anger or irritability.  Usually sadness or happy.  10/18/2023 REBA 7 score= 13/27; 11/29/2023 score 2/27  10/18/2023 PHQ-9 score= 14/27; 11/29/2023 score 9/27  However, he is taking melatonin 10 mg in alternating with Benadryl 25 mg and is still only getting 5 hours a night.  Appetite: good.  On questionnaire reported sometimes eats too little or too much.  Jerry states that his goal and goal of treatment is \"to be happy.\" Father reports the same. (Magic wand question).  He reports low self-esteem. Factors that would help him feel better: more productive (looking for work) and having more friends.    Psy/SI/HI/aggression: Denies A/VH, denies aggression. Denies manic/hypomanic s/s.  History: " 2019 he was afraid to go places because he thought other people would think mean things about him.  + SI - thoughts out of no where to hang self, Doesn't like self, never thought highly of self, doesn't think he is smart, no friends in school.  Approx August 2023, SA by hanging with jump rope- has cut rope up.  Supports: all family.  Coping: video games, editing, online friends.     Daily schedule None  Med Physicians:  PCP: Dr. Kinney   CCBDD - Emily Rosales  ALLERGIES: NKDA    MEDICAL REVIEW OF SYSTEMS:  GEN: denies fever, chills, night sweats  HEENT: denies changes in vision, eye pain, hearing changes, no symptoms of upper respiratory infection  CARDIO: denies chest pain and palpitations   RESP: +asthma  GI: denies nausea/vomiting/constipation/diarrhea, or blood in the stool  : denies burning/frequency/urgency, or bloody urine  NEURO: denies tremor, dizziness, numbness or tingling  MSK: denies muscle spasms or stiffness  DERM: denies new onset of rash  Med SE    COMPLIANCE: unknown    MMS:  ORIENTATION   alert  ambulatory  cooperative     BEHAVIOR:  enters easily  eye contact fair  gait normal  reciprocal interaction  spontaneous speech    MEMORY:  Appears intact    SENSORY :  Normal    COMMUNICATION:  conversational  Soft spoken    AFFECT:  normal/full    MOOD: depressed    THOUGHT PROCESS:  WNL    THOUGHT Content:  Denies SI    CONCENTRATION  Normal- maybe impaired dt past hx of ADHD    FUND OF KNOWLEDGE :  mild disability    JUDGEMENT: fair    EPS: N/A  AIMS negative    LABS REVIEWED:  none available  EKG none available    ASSESSMENT:         Mr. Leonard presents at baseline mental health wise.   See treatment plan below.    PLAN:   problems treated   f/u requested to prevent relapse   medications renewed/re-ordered    Continue medications from inpatient hospitalization:  Continue hydroxyzine HCL (Atarax) 50 mg by mouth every 4 hours as needed for anxiety  2. Continue trazodone 50 mg by mouth at  bedtime as needed for sleep  3. Continue fluoxetine (Prozac) 40 mg by mouth daily for depression and anxiety.  4.  Continue melatonin 9 mg by mouth at bedtime for sleep disturbances. 4 mg in the past not effective.  5. Risks. benefits, alternatives reviewed. Ct perceives the benefit to outweigh the risk.  6. Return to clinic 7/31 AT 3 PM,  or earlier if needed. Call [phone] to reschedule.  7.  Consider labs, especially vitamin D level next appointment  8. Discussed writing an accommodation letter for Jerry when he is ready to go to college.    Thank you for seeing me today. If you have any questions or concerns, do not hesitate to contact my office.  Leonor Barker TYPE         counseling and coordination of care , addressing signs and symptoms of illness; risks/benefits and side effects of medications; and behavioral approaches to illness.  This note was created using electronic dictation. There may be errors in syntax and meaning. Please contact the office with any questions.  For [facility] residents, [employer] is a 24/7 hotline you can call for assistance [[phone]].   Please call 911 or go to your closest Emergency Room if you feel worse. This includes thoughts of hurting yourself or anyone else, or having other troubles such as hearing voices, seeing visions, or having new and scary thoughts about the people around you.

## 2024-06-05 NOTE — PATIENT INSTRUCTIONS
Mr. Leonard presents at baseline mental health wise.   See treatment plan below.    PLAN:   problems treated   f/u requested to prevent relapse   medications renewed/re-ordered    Continue medications from inpatient hospitalization:  Continue hydroxyzine HCL (Atarax) 50 mg by mouth every 4 hours as needed for anxiety  2. Continue trazodone 50 mg by mouth at bedtime as needed for sleep  3. Continue fluoxetine (Prozac) 40 mg by mouth daily for depression and anxiety.  4.  Continue melatonin 9 mg by mouth at bedtime for sleep disturbances. 4 mg in the past not effective.  5. Risks. benefits, alternatives reviewed. Ct perceives the benefit to outweigh the risk.  6. Return to clinic 1 month or earlier if needed. Call [phone] to reschedule.  7.  Consider labs, especially vitamin D level next appointment  8. Discussed writing an accommodation letter for Jerry when he is ready to go to college.    Thank you for seeing me today. If you have any questions or concerns, do not hesitate to contact my office.  Leonor Barker

## 2024-06-21 ENCOUNTER — APPOINTMENT (OUTPATIENT)
Dept: BEHAVIORAL HEALTH | Facility: CLINIC | Age: 24
End: 2024-06-21
Payer: COMMERCIAL

## 2024-06-21 DIAGNOSIS — F41.1 GAD (GENERALIZED ANXIETY DISORDER): ICD-10-CM

## 2024-06-21 DIAGNOSIS — F32.2 CURRENT SEVERE EPISODE OF MAJOR DEPRESSIVE DISORDER WITHOUT PSYCHOTIC FEATURES WITHOUT PRIOR EPISODE (MULTI): ICD-10-CM

## 2024-06-21 DIAGNOSIS — F84.0 AUTISM (HHS-HCC): ICD-10-CM

## 2024-06-21 PROCEDURE — 90832 PSYTX W PT 30 MINUTES: CPT | Performed by: COUNSELOR

## 2024-06-21 NOTE — PROGRESS NOTES
Total Time: 18 min  Diagnosis: MDD, Anxiety, ASD  Visit Type: epic  Reason for visit: managing his mood     - notes he did not start his work program, he notes he is not sure what happened, but he thinks he starts next week  - when asked about his mood he said its been ok, no negative thoughts and notes its been pretty   - when asked what he has been doing, he notes just walking around, when asked about the heat, he notes it doesn't bother him  - when asked about the “mistake making” he notes he hasn't been making them as much, or if he has, he hasn't noticed or hasn't been beating himself up for it  - continues to be difficult to engage, sits in silence, was asked to make a list of things he wants to talk about (attempted to talk about video games), things on his mind, good or not so good, etc    focused on:  - practiced what to say to himself when he “ignores the thought” (its not true, not going to think that way)  - continue with family communication skills  - STOP think, think before you act

## 2024-07-29 ENCOUNTER — APPOINTMENT (OUTPATIENT)
Dept: BEHAVIORAL HEALTH | Facility: CLINIC | Age: 24
End: 2024-07-29
Payer: COMMERCIAL

## 2024-07-29 DIAGNOSIS — F84.0 AUTISM (HHS-HCC): ICD-10-CM

## 2024-07-29 DIAGNOSIS — F32.2 CURRENT SEVERE EPISODE OF MAJOR DEPRESSIVE DISORDER WITHOUT PSYCHOTIC FEATURES WITHOUT PRIOR EPISODE (MULTI): ICD-10-CM

## 2024-07-29 DIAGNOSIS — F41.1 GAD (GENERALIZED ANXIETY DISORDER): ICD-10-CM

## 2024-07-29 PROCEDURE — 90832 PSYTX W PT 30 MINUTES: CPT | Performed by: COUNSELOR

## 2024-07-29 NOTE — PROGRESS NOTES
Total Time: 28 min  Diagnosis: MDD, Anxiety, ASD  Visit Type: epic  Reason for visit: managing his mood and worry     - notes overall his Day program going well; they have pushed him to do some things he doesn't want to do, but they mostly stopped asking  - he notes some Very small intrusive thoughts at night but is able to use positive thinking to stop them  - notes Difficulty sleeping, but when offered ideas to adjust his sleep times he notes he doesn't want to change bc hell “just wake up more during the night”  - did express worry about his “bad ” running red lights and blaring music, he is worried about an accident     focused on:  - positive self talk, coping skills, sleep hygiene (not currently open to much change)  - continue with family communication skills  - STOP think, think before you act

## 2024-07-31 ENCOUNTER — APPOINTMENT (OUTPATIENT)
Dept: BEHAVIORAL HEALTH | Facility: CLINIC | Age: 24
End: 2024-07-31
Payer: COMMERCIAL

## 2024-07-31 VITALS
SYSTOLIC BLOOD PRESSURE: 108 MMHG | BODY MASS INDEX: 25.87 KG/M2 | RESPIRATION RATE: 16 BRPM | DIASTOLIC BLOOD PRESSURE: 65 MMHG | HEART RATE: 78 BPM | TEMPERATURE: 97.9 F | WEIGHT: 185.5 LBS

## 2024-07-31 DIAGNOSIS — F32.2 CURRENT SEVERE EPISODE OF MAJOR DEPRESSIVE DISORDER WITHOUT PSYCHOTIC FEATURES WITHOUT PRIOR EPISODE (MULTI): ICD-10-CM

## 2024-07-31 DIAGNOSIS — G47.9 SLEEP DISTURBANCE: ICD-10-CM

## 2024-07-31 DIAGNOSIS — E55.9 VITAMIN D DEFICIENCY: ICD-10-CM

## 2024-07-31 DIAGNOSIS — F41.1 GAD (GENERALIZED ANXIETY DISORDER): ICD-10-CM

## 2024-07-31 PROCEDURE — 99215 OFFICE O/P EST HI 40 MIN: CPT | Performed by: NURSE PRACTITIONER

## 2024-07-31 RX ORDER — HYDROXYZINE HYDROCHLORIDE 50 MG/1
50 TABLET, FILM COATED ORAL EVERY 4 HOURS PRN
Qty: 90 TABLET | Refills: 0 | Status: SHIPPED | OUTPATIENT
Start: 2024-07-31 | End: 2024-08-30

## 2024-07-31 RX ORDER — CHOLECALCIFEROL (VITAMIN D3) 25 MCG
1000 TABLET ORAL DAILY
Qty: 90 TABLET | Refills: 3 | Status: SHIPPED | OUTPATIENT
Start: 2024-07-31 | End: 2025-07-31

## 2024-07-31 RX ORDER — TRAZODONE HYDROCHLORIDE 50 MG/1
TABLET ORAL
Qty: 180 TABLET | Refills: 1 | Status: SHIPPED | OUTPATIENT
Start: 2024-07-31

## 2024-07-31 RX ORDER — FLUOXETINE HYDROCHLORIDE 40 MG/1
40 CAPSULE ORAL DAILY
Qty: 90 CAPSULE | Refills: 1 | Status: SHIPPED | OUTPATIENT
Start: 2024-07-31

## 2024-07-31 RX ORDER — TALC
9 POWDER (GRAM) TOPICAL NIGHTLY PRN
Qty: 270 TABLET | Refills: 1 | Status: SHIPPED | OUTPATIENT
Start: 2024-07-31 | End: 2025-01-27

## 2024-07-31 ASSESSMENT — PAIN SCALES - GENERAL: PAINLEVEL: 0-NO PAIN

## 2024-07-31 NOTE — PROGRESS NOTES
PRESENT FOR APPOINTMENT  Client  Guardian/father Thien eLonard  F2F  Martha Moreira NP with consent    SUBJECTIVE 24-year-old black male with a history of autism spectrum disorder, major depressive disorder, severe, without psychosis, generalized anxiety disorder, sleep disturbances, and past history of ADHD presenting for medication management.  History of present illness  Family history: - Mat great uncle  Pat second cousin  Parents: lives with   Siblings 3 brothers, 2 sisters- middle child, but youngest of boys  Birth no complications  Development: met milestones on time    DX ASD w/ID  1st grade DX ADHD  2017 ID level [id number]  School: [employer]- special Ed   Occupations None  Abuse: denies  Habits: plays [employer] games, walks  Jainism: no  Past psychiatric none; never been on meds- other than Melatonin 4 mg- not effective.  MEDICATIONS at time of initial evaluation:  OTC benedryl for allergies, melatonin 4 mg and Benadryl 25 mg for sleep.    Last seen June 2024. At that time, no medication changes.   May 2024.  At that time, inpatient hospitalized medications were continued, in person.  Check and phone calls completed on 2 weeks as discussed.  January 2024.  At that time, methylphenidate ER (Concerta) was started and trazodone was discontinued due to side effects of itchy eyes.  Methylphenidate DC in interim dt decrease in happiness.  In person November 2023. At that time, fluoxetine was increased and Trazodone was started.    Miles reports things have been good.   Denies SI.  Family concerned about ct being tired a lot. Father reports he wakes up at 6am to get ready for day program and there until 3pm, when ct gets home he sleeps until 6pm then goes back to bed around midnight. He has missed days or asks to miss day program due to being tired.   Continues therapy with Ciara.   Starting work training ([employer]) 6/24 7-3 PM and continues to do this but they have been doing more outings. Plan  "is to train for different work programs in Aug/Sept.   He was inpatient hospitalized at [facility] 4/22/2024 for 3 days after being taken by police.  A passerby saw him on the bridge.  Positive suicidal ideation to jump.    His father reports that [facility] of developmental disabilities has a new , Ivana Gong, who is helping him get into a vocational program,[facility], Monday through Friday 7-3.  They are also helping him get into [facility] to become a .    with the increase of Prozac he has had  \"bad thoughts\" are in the middle. Beginning of jan 2024 that he is not smart.  Last thought of the suicidal ideation approximately October 29, 2023.  Which is approximately 2 weeks after the start of Prozac.  The thoughts were that he felt like a failure and that he would be better off dead.  He missed therapy appointment with Ciara Macias in December 2023.  He still struggles with self-esteem.  Wants to go to college for . [employer]  He states he is afraid to ask for things because he is afraid to get something worse than the answer now.  Which is a lecture.  He has never received anything worse than a lecture, but this answer goes to show how he follows rules.  Father reports that his moods have improved by 50%.  No longer feels that his moods are like a \"roller coaster\".  Never displays anger or irritability.  Usually sadness or happy.  10/18/2023 REBA 7 score= 13/27; 11/29/2023 score 2/27  10/18/2023 PHQ-9 score= 14/27; 11/29/2023 score 9/27  However, he is taking melatonin 10 mg in alternating with Benadryl 25 mg and is still only getting 5 hours a night.  Appetite: good.  On questionnaire reported sometimes eats too little or too much.  Jerry states that his goal and goal of treatment is \"to be happy.\" Father reports the same. (Magic wand question).  He reports low self-esteem. Factors that would help him feel better: more productive (looking " for work) and having more friends.    Psy/SI/HI/aggression: Denies A/VH, denies aggression. Denies manic/hypomanic s/s.  History: 2019 he was afraid to go places because he thought other people would think mean things about him.  + SI - thoughts out of no where to hang self, Doesn't like self, never thought highly of self, doesn't think he is smart, no friends in school.  Approx August 2023, SA by hanging with jump rope- has cut rope up.  Supports: all family.  Coping: video games, editing, online friends.     Daily schedule None  Med Physicians:  PCP: Dr. Kinney   CCBDD [facility]    MEDICAL REVIEW OF SYSTEMS:  GEN: denies fever, chills, night sweats  HEENT: denies changes in vision, eye pain, hearing changes, no symptoms of upper respiratory infection  CARDIO: denies chest pain and palpitations   RESP: +asthma  GI: denies nausea/vomiting/constipation/diarrhea, or blood in the stool  : denies burning/frequency/urgency, or bloody urine  NEURO: denies tremor, dizziness, numbness or tingling  MSK: denies muscle spasms or stiffness  DERM: denies new onset of rash  Med SE    COMPLIANCE: unknown    MMS:  ORIENTATION   alert  ambulatory  cooperative     BEHAVIOR:  enters easily  eye contact fair  gait normal  reciprocal interaction  spontaneous speech    MEMORY:  Appears intact    SENSORY :  Normal    COMMUNICATION:  conversational  Soft spoken    AFFECT:  normal/full    MOOD: depressed    THOUGHT PROCESS:  WNL    THOUGHT Content:  Denies SI    CONCENTRATION  Normal- maybe impaired dt past hx of ADHD    FUND OF KNOWLEDGE :  mild disability    JUDGEMENT: fair    EPS: N/A  AIMS negative    LABS REVIEWED:  04/2024- Vitamin D: 27.7, TSH: 6.740  EKG none available    ASSESSMENT:         Mr. Leonard presents at baseline mental health wise.   See treatment plan below.    PLAN:   problems treated   f/u requested to prevent relapse   medications renewed/re-ordered    Start Vit D3 1000 units by mouth daily   Continue  hydroxyzine HCL (Atarax) 50 mg by mouth every 4 hours as needed for anxiety  Start an additional 50 mg dose of Trazodone PRN for sleep. Continue Trazodone 50 mg by mouth at bedtime as needed for sleep, to equal 100 mg at bedtime.   Continue fluoxetine (Prozac) 40 mg by mouth daily for depression and anxiety.  4.  Continue melatonin 9 mg by mouth at bedtime for sleep disturbances. 4 mg in the past not effective.  5. Risks. benefits, alternatives reviewed. Ct perceives the benefit to outweigh the risk.  6. Return to clinic 8/28/2024 and 9/16/2024 at 3 PM,  or earlier if needed. Call [phone] to reschedule.    Thank you for seeing me today. If you have any questions or concerns, do not hesitate to contact my office.  Leonor Barker TYPE         counseling and coordination of care , addressing signs and symptoms of illness; risks/benefits and side effects of medications; and behavioral approaches to illness.  This note was created using electronic dictation. There may be errors in syntax and meaning. Please contact the office with any questions.  For [facility] residents, [employer] is a 24/7 hotline you can call for assistance [[phone]].   Please call 911 or go to your closest Emergency Room if you feel worse. This includes thoughts of hurting yourself or anyone else, or having other troubles such as hearing voices, seeing visions, or having new and scary thoughts about the people around you.

## 2024-07-31 NOTE — PATIENT INSTRUCTIONS
ASSESSMENT:                                                                                      Mr. Leonard presents at baseline mental health wise.   Discussed sleep hygiene. Will try hydroxyzine at bedtime and turn off electronics, etc.  See treatment plan below.     PLAN:   problems treated   f/u requested to prevent relapse   medications renewed/re-ordered     Start Vitamin D3 1000 units by mouth daily for vit d def. April 2024 vit D 27.7 (31-80).   2.  Increase trazodone 50 mg by mouth at bedtime, may take an additional 50 mg by mouth as needed for sleep.  3. Continue fluoxetine (Prozac) 40 mg by mouth daily for depression and anxiety.  4.  Continue melatonin 9 mg by mouth at bedtime for sleep disturbances. 4 mg in the past not effective.  5. Continue hydroxyzine HCL (Atarax) 50 mg by mouth every 4 hours as needed for anxiety  6.  Risks, benefits, alternatives, off-label uses, and side effects of medications have been discussed with patient/caregiver. There is no report of signs/symptoms consistent with medication-induced impairment in daily functioning. At this time, benefits of medication felt to outweigh potential risks. Will continue to reassess need for psychotropic medication at regular 3-6 month intervals.  7. Return to clinic 8/28/2024 at 3 PM and 9/18/2024 at 3 PM  or earlier if needed. Call [phone] to reschedule.     Thank you for seeing me today. If you have any questions or concerns, do not hesitate to contact my office.  Leonor Barker

## 2024-07-31 NOTE — PROGRESS NOTES
"April 2024 inpt med changes: fluoxetine increased from 20 mg to 40 mg; trazodone restarted (ct had stopped dt \"itchy eyes\"); Melatonin from 10 mg to 9 mg and hydroxyzine PRN started.    ASSESSMENT:                                                                                      Mr. Leonard presents at baseline mental health wise.   Discussed sleep hygiene. Will try hydroxyzine at bedtime and turn off electronics, etc.  See treatment plan below.     PLAN:   problems treated   f/u requested to prevent relapse   medications renewed/re-ordered     Start Vitamin D3 1000 units by mouth daily for vit d def. April 2024 vit D 27.7 (31-80).   2.  Increase trazodone 50 mg by mouth at bedtime, may take an additional 50 mg by mouth as needed for sleep.  3. Continue fluoxetine (Prozac) 40 mg by mouth daily for depression and anxiety.  4.  Continue melatonin 9 mg by mouth at bedtime for sleep disturbances. 4 mg in the past not effective.  5. Continue hydroxyzine HCL (Atarax) 50 mg by mouth every 4 hours as needed for anxiety  6.  Risks, benefits, alternatives, off-label uses, and side effects of medications have been discussed with patient/caregiver. There is no report of signs/symptoms consistent with medication-induced impairment in daily functioning. At this time, benefits of medication felt to outweigh potential risks. Will continue to reassess need for psychotropic medication at regular 3-6 month intervals.  7. Return to clinic 8/28/2024 at 3 PM and 9/18/2024 at 3 PM or earlier if needed. Call [phone] to reschedule.     Thank you for seeing me today. If you have any questions or concerns, do not hesitate to contact my office.  Leonor Barker     "

## 2024-08-14 DIAGNOSIS — F32.2 CURRENT SEVERE EPISODE OF MAJOR DEPRESSIVE DISORDER WITHOUT PSYCHOTIC FEATURES WITHOUT PRIOR EPISODE (MULTI): ICD-10-CM
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
## 2024-08-16 ENCOUNTER — OFFICE VISIT (OUTPATIENT)
Dept: BEHAVIORAL HEALTH | Facility: CLINIC | Age: 24
End: 2024-08-16
Payer: COMMERCIAL

## 2024-08-16 DIAGNOSIS — F41.1 GAD (GENERALIZED ANXIETY DISORDER): Primary | ICD-10-CM

## 2024-08-16 DIAGNOSIS — E55.9 VITAMIN D DEFICIENCY: ICD-10-CM

## 2024-08-16 DIAGNOSIS — F84.0 AUTISM SPECTRUM DISORDER REQUIRING SUBSTANTIAL SUPPORT (LEVEL 2) (HHS-HCC): ICD-10-CM

## 2024-08-16 NOTE — PATIENT INSTRUCTIONS
ASSESSMENT:                                                                                      Mr. Leonard presents for an earlier appointment due to increased anxiety over episodes that happened during the weekend.  Jerry was able to process the situation.  Denies suicidal ideation.  He was receptive to support and reassurance.  Therapist Ciara will provide resources for social opportunities.  See treatment plan below.     PLAN:   problems treated   f/u requested to prevent relapse   medications renewed/re-ordered     Continue Vitamin D3 1000 units by mouth daily for vit d def. April 2024 vit D 27.7 (31-80).   2.  Continue trazodone 50 mg by mouth at bedtime, may take an additional 50 mg by mouth as needed for sleep.  3. Continue fluoxetine (Prozac) 40 mg by mouth daily for depression and anxiety.  4.  Continue melatonin 9 mg by mouth at bedtime for sleep disturbances. 4 mg in the past not effective.  5. Continue hydroxyzine HCL (Atarax) 50 mg by mouth every 4 hours as needed for anxiety  6.  Risks, benefits, alternatives, off-label uses, and side effects of medications have been discussed with patient/caregiver. There is no report of signs/symptoms consistent with medication-induced impairment in daily functioning. At this time, benefits of medication felt to outweigh potential risks. Will continue to reassess need for psychotropic medication at regular 3-6 month intervals.  7. Return to clinic 8/28/2024 at 3 PM and 9/18/2024 at 3 PM or earlier if needed. Call [phone] to reschedule.     Thank you for seeing me today. If you have any questions or concerns, do not hesitate to contact my office.  Leonor Barker
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
## 2024-08-22 ENCOUNTER — APPOINTMENT (OUTPATIENT)
Dept: BEHAVIORAL HEALTH | Facility: CLINIC | Age: 24
End: 2024-08-22
Payer: COMMERCIAL

## 2024-08-22 DIAGNOSIS — F84.0 AUTISM (HHS-HCC): ICD-10-CM

## 2024-08-22 DIAGNOSIS — F41.1 GAD (GENERALIZED ANXIETY DISORDER): ICD-10-CM

## 2024-08-22 DIAGNOSIS — F32.2 CURRENT SEVERE EPISODE OF MAJOR DEPRESSIVE DISORDER WITHOUT PSYCHOTIC FEATURES WITHOUT PRIOR EPISODE (MULTI): ICD-10-CM

## 2024-08-22 PROCEDURE — 90834 PSYTX W PT 45 MINUTES: CPT | Performed by: COUNSELOR

## 2024-08-22 NOTE — PROGRESS NOTES
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

## 2024-08-28 ENCOUNTER — APPOINTMENT (OUTPATIENT)
Dept: BEHAVIORAL HEALTH | Facility: CLINIC | Age: 24
End: 2024-08-28
Payer: COMMERCIAL

## 2024-09-18 ENCOUNTER — APPOINTMENT (OUTPATIENT)
Dept: BEHAVIORAL HEALTH | Facility: CLINIC | Age: 24
End: 2024-09-18
Payer: COMMERCIAL

## 2024-09-18 VITALS
RESPIRATION RATE: 18 BRPM | DIASTOLIC BLOOD PRESSURE: 69 MMHG | TEMPERATURE: 98 F | WEIGHT: 189.1 LBS | HEART RATE: 88 BPM | BODY MASS INDEX: 26.37 KG/M2 | SYSTOLIC BLOOD PRESSURE: 108 MMHG

## 2024-09-18 DIAGNOSIS — F41.1 GAD (GENERALIZED ANXIETY DISORDER): ICD-10-CM

## 2024-09-18 DIAGNOSIS — G47.9 SLEEP DISTURBANCE: ICD-10-CM

## 2024-09-18 DIAGNOSIS — F90.0 ATTENTION DEFICIT HYPERACTIVITY DISORDER (ADHD), PREDOMINANTLY INATTENTIVE TYPE: ICD-10-CM

## 2024-09-18 PROCEDURE — 99215 OFFICE O/P EST HI 40 MIN: CPT | Performed by: NURSE PRACTITIONER

## 2024-09-18 RX ORDER — METHYLPHENIDATE HYDROCHLORIDE 18 MG/1
18 TABLET ORAL EVERY MORNING
Qty: 30 TABLET | Refills: 0 | Status: SHIPPED | OUTPATIENT
Start: 2024-11-17 | End: 2024-12-17

## 2024-09-18 RX ORDER — METHYLPHENIDATE HYDROCHLORIDE 18 MG/1
18 TABLET ORAL EVERY MORNING
Qty: 30 TABLET | Refills: 0 | Status: SHIPPED | OUTPATIENT
Start: 2024-09-18 | End: 2024-10-18

## 2024-09-18 RX ORDER — LORAZEPAM 0.5 MG/1
0.5 TABLET ORAL DAILY PRN
Qty: 20 TABLET | Refills: 0 | Status: SHIPPED | OUTPATIENT
Start: 2024-09-18 | End: 2025-09-18

## 2024-09-18 RX ORDER — METHYLPHENIDATE HYDROCHLORIDE 18 MG/1
18 TABLET ORAL EVERY MORNING
Qty: 30 TABLET | Refills: 0 | Status: SHIPPED | OUTPATIENT
Start: 2024-10-18 | End: 2024-11-17

## 2024-09-18 ASSESSMENT — PAIN SCALES - GENERAL: PAINLEVEL: 0-NO PAIN
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
## 2024-09-18 NOTE — PATIENT INSTRUCTIONS
ASSESSMENT:                                                                                      Mr. Leonard presents with improvements noted taking Concerta x 3 weeks.    See treatment plan below.     PLAN:   problems treated   f/u requested to prevent relapse   medications renewed/re-ordered     Start methylphenidate ER (Concerta) 18 mg by mouth every morning for ADHD. I have reviewed the report 9/18/2024. None shown. I have considered the risk for abuse and diversion. Last filled 3/17/24.  [facility] Agreement signed 9/18/2024  Start lorazepam (Ativan) 0.5 mg by mouth daily as needed for anxiety  [facility] Agreement signed 9/18/2024  Discontinue hydroxyzine HCL (Atarax) 50 mg by mouth every 4 hours as n  Switch to dinner (with food) dt c/o upset stomach: Vitamin D3 1000 units by mouth dinner with food for vit d def. April 2024 vit D 27.7 (31-80).   5. Continue fluoxetine (Prozac) 40 mg by mouth daily for depression and anxiety.  6.  Continue melatonin 9 mg by mouth at bedtime for sleep disturbances. 4 mg in the past not effective.  7.  Continue trazodone 50 mg by mouth at bedtime, may take an additional 50 mg by mouth as needed for sleep.  8. Risks, benefits, alternatives, off-label uses, and side effects of medications have been discussed with patient/caregiver. There is no report of signs/symptoms consistent with medication-induced impairment in daily functioning. At this time, benefits of medication felt to outweigh potential risks. Will continue to reassess need for psychotropic medication at regular 3-6 month intervals.  9. Return to clinic 1-2 months or earlier if needed. Call [phone] to reschedule.  10. SA Mishel Magdaleno called to expedite release to work.  left [phone]  Her direct # [phone]     Thank you for seeing me today. If you have any questions or concerns, do not hesitate to contact my office.  Leonor Barker  TREATMENT TYPE         counseling and coordination of care; addressing signs and  symptoms of illness; risks/benefits and side effects of medications; and behavioral approaches to illness.  This note was created using electronic dictation. There may be errors in syntax and meaning. Please contact the office with any questions.

## 2024-09-19 ENCOUNTER — TELEPHONE (OUTPATIENT)
Dept: OTHER | Age: 24
End: 2024-09-19
Payer: COMMERCIAL

## 2024-09-19 NOTE — TELEPHONE ENCOUNTER
Ivana called returning a call from provider in regard to Jerry. She can be reached at [phone]

## 2024-09-19 NOTE — PROGRESS NOTES
Ivana Farideh [phone]  Sofiya Alberto43 minutes ago (12:54 PM)     Ivana called returning a call from provider in regard to Jerry. She can be reached at [phone]   [employer] day program= [employer] is owner, Matt.  Need to change in ISP.  She used to do vocational training  Worried he will outgrow the program  Referral ODD or .

## 2024-09-22 ENCOUNTER — HOSPITAL ENCOUNTER (EMERGENCY)
Facility: HOSPITAL | Age: 24
Discharge: HOME | End: 2024-09-22
Payer: COMMERCIAL

## 2024-09-22 ENCOUNTER — APPOINTMENT (OUTPATIENT)
Dept: RADIOLOGY | Facility: HOSPITAL | Age: 24
End: 2024-09-22
Payer: COMMERCIAL

## 2024-09-22 VITALS
TEMPERATURE: 98.6 F | RESPIRATION RATE: 16 BRPM | OXYGEN SATURATION: 98 % | BODY MASS INDEX: 26.04 KG/M2 | HEART RATE: 117 BPM | HEIGHT: 71 IN | DIASTOLIC BLOOD PRESSURE: 72 MMHG | WEIGHT: 186 LBS | SYSTOLIC BLOOD PRESSURE: 122 MMHG

## 2024-09-22 DIAGNOSIS — S93.601A FOOT SPRAIN, RIGHT, INITIAL ENCOUNTER: Primary | ICD-10-CM

## 2024-09-22 PROCEDURE — 73590 X-RAY EXAM OF LOWER LEG: CPT | Mod: RT

## 2024-09-22 PROCEDURE — 73610 X-RAY EXAM OF ANKLE: CPT | Mod: RT

## 2024-09-22 PROCEDURE — 2500000001 HC RX 250 WO HCPCS SELF ADMINISTERED DRUGS (ALT 637 FOR MEDICARE OP): Mod: SE | Performed by: PHYSICIAN ASSISTANT

## 2024-09-22 PROCEDURE — 99284 EMERGENCY DEPT VISIT MOD MDM: CPT

## 2024-09-22 PROCEDURE — 73590 X-RAY EXAM OF LOWER LEG: CPT | Mod: RIGHT SIDE | Performed by: STUDENT IN AN ORGANIZED HEALTH CARE EDUCATION/TRAINING PROGRAM

## 2024-09-22 PROCEDURE — 73610 X-RAY EXAM OF ANKLE: CPT | Mod: RIGHT SIDE | Performed by: STUDENT IN AN ORGANIZED HEALTH CARE EDUCATION/TRAINING PROGRAM

## 2024-09-22 PROCEDURE — 73630 X-RAY EXAM OF FOOT: CPT | Mod: RIGHT SIDE | Performed by: STUDENT IN AN ORGANIZED HEALTH CARE EDUCATION/TRAINING PROGRAM

## 2024-09-22 PROCEDURE — 73630 X-RAY EXAM OF FOOT: CPT | Mod: RT

## 2024-09-22 RX ORDER — ACETAMINOPHEN 500 MG
1000 TABLET ORAL EVERY 8 HOURS PRN
Qty: 30 TABLET | Refills: 0 | Status: SHIPPED | OUTPATIENT
Start: 2024-09-22 | End: 2024-10-02

## 2024-09-22 RX ORDER — IBUPROFEN 600 MG/1
600 TABLET ORAL EVERY 6 HOURS PRN
Qty: 28 TABLET | Refills: 0 | Status: SHIPPED | OUTPATIENT
Start: 2024-09-22 | End: 2024-09-29

## 2024-09-22 RX ORDER — IBUPROFEN 600 MG/1
600 TABLET ORAL ONCE
Status: COMPLETED | OUTPATIENT
Start: 2024-09-22 | End: 2024-09-22

## 2024-09-22 ASSESSMENT — PAIN DESCRIPTION - LOCATION: LOCATION: FOOT

## 2024-09-22 ASSESSMENT — PAIN DESCRIPTION - DESCRIPTORS: DESCRIPTORS: THROBBING

## 2024-09-22 ASSESSMENT — COLUMBIA-SUICIDE SEVERITY RATING SCALE - C-SSRS
6. HAVE YOU EVER DONE ANYTHING, STARTED TO DO ANYTHING, OR PREPARED TO DO ANYTHING TO END YOUR LIFE?: NO
1. IN THE PAST MONTH, HAVE YOU WISHED YOU WERE DEAD OR WISHED YOU COULD GO TO SLEEP AND NOT WAKE UP?: NO
2. HAVE YOU ACTUALLY HAD ANY THOUGHTS OF KILLING YOURSELF?: NO

## 2024-09-22 ASSESSMENT — PAIN - FUNCTIONAL ASSESSMENT: PAIN_FUNCTIONAL_ASSESSMENT: 0-10

## 2024-09-22 ASSESSMENT — PAIN SCALES - GENERAL: PAINLEVEL_OUTOF10: 8

## 2024-09-22 ASSESSMENT — PAIN DESCRIPTION - ORIENTATION: ORIENTATION: RIGHT

## 2024-09-22 NOTE — Clinical Note
Jerry Leonard was seen and treated in our emergency department on 9/22/2024.  He may return to work on 09/25/2024.       If you have any questions or concerns, please don't hesitate to call.      Aiyana Reddy PA-C

## 2024-09-22 NOTE — ED PROVIDER NOTES
Emergency Department Encounter  [facility] EMERGENCY MEDICINE    Patient: Jerry Leonard  MRN: [id number]  : 2000  Date of Evaluation: 2024  ED Provider: Aiyana Reddy PA-C      Chief Complaint       Chief Complaint   Patient presents with    Foot Injury     HPI    Jerry Leonard is a 24 y.o. male who presents to the emergency department presenting for right foot pain after jumping off of a fence just prior to ED arrival.  Has not taken any pain medications.  Has significant pain and swelling to dorsal aspect of the affected foot.  Denies hitting his head or losing consciousness.  Does not take any blood thinners.  Denies any other associated injuries.  No associated numbness, tingling, weakness or loss of function.  States he is not able to fully bear weight and ambulate on affected extremity.  Denies any open wounds or lacerations.    ROS:     Review of Systems  14 systems reviewed and otherwise acutely negative except as in the HPI.    Past History   No past medical history on file.  No past surgical history on file.  Social History     Socioeconomic History    Marital status: Single   Tobacco Use    Smoking status: Never    Smokeless tobacco: Never   Substance and Sexual Activity    Alcohol use: Never    Drug use: Never       Medications/Allergies     Discharge Medication List as of 2024  5:42 PM        CONTINUE these medications which have NOT CHANGED    Details   aspirin-acetaminophen-caffeine (Excedrin Migraine) 250-250-65 mg tablet Take 1 tablet by mouth every 6 hours if needed for headaches., Historical Med      cholecalciferol (Vitamin D3) 25 MCG (1000 UT) tablet Take 1 tablet (1,000 Units) by mouth once daily., Starting 2024, Until 2025, Normal      FLUoxetine (PROzac) 40 mg capsule Take 1 capsule (40 mg) by mouth once daily., Starting 2024, Normal      hydrOXYzine HCL (Atarax) 50 mg tablet Take 1 tablet (50 mg) by mouth every 4 hours if  needed for anxiety., Starting Wed 7/31/2024, Until Fri 8/30/2024 at 2359, Normal      LORazepam (Ativan) 0.5 mg tablet Take 1 tablet (0.5 mg) by mouth once daily as needed for anxiety., Starting Wed 9/18/2024, Until Thu 9/18/2025 at 2359, Normal      melatonin 3 mg tablet Take 3 tablets (9 mg) by mouth as needed at bedtime for sleep., Starting Wed 7/31/2024, Until Mon 1/27/2025 at 2359, Normal      !! methylphenidate ER (Concerta) 18 mg extended release tablet Take 1 tablet (18 mg) by mouth once daily in the morning. Do not crush, chew, or split., Starting Wed 9/18/2024, Until Fri 10/18/2024, Normal      !! methylphenidate ER (Concerta) 18 mg extended release tablet Take 1 tablet (18 mg) by mouth once daily in the morning. Do not crush, chew, or split. Do not fill before October 18, 2024., Starting Fri 10/18/2024, Until Sun 11/17/2024, Normal      !! methylphenidate ER (Concerta) 18 mg extended release tablet Take 1 tablet (18 mg) by mouth once daily in the morning. Do not crush, chew, or split. Do not fill before November 17, 2024., Starting Sun 11/17/2024, Until Tue 12/17/2024, Normal      traZODone (Desyrel) 50 mg tablet Take 50 mg by mouth at bedtime, may take an additional 50 mg by mouth as needed., Normal       !! - Potential duplicate medications found. Please discuss with provider.        Allergies   Allergen Reactions    Cat's Claw Unknown    House Dust Unknown        Physical Exam       ED Triage Vitals [09/22/24 1627]   Temperature Heart Rate Respirations BP   37 °C (98.6 °F) (!) 117 16 122/72      Pulse Ox Temp Source Heart Rate Source Patient Position   98 % Temporal Monitor Sitting      BP Location FiO2 (%)     Left arm --         Physical Exam    Physical Exam:     VS: As documented in the triage note and EMR flowsheet from this visit were reviewed.    Appearance: Alert, oriented, cooperative, in no acute distress. Well nourished & well hydrated.    Skin: Atraumatic. Warm, intact and dry. No lesions,  "rash, or petechiae.    Neck: Supple, without midline tenderness    Pulmonary: Clear bilaterally with good chest wall excursion. No rales, rhonchi or wheezing. No accessory muscle use or stridor. Nonlabored breathing, no supplemental oxygen.    Cardiac: Normal S1, S2 without murmur, rub, gallop or extrasystole.    Musculoskeletal: Spontaneously moving all extremities without limitation. No midline tenderness. Extremities warm and well-perfused, capillary refill less than 2 seconds. Pulses full and equal. Significant edema to dorsal R midfoot without associated erythema or increased warmth.  No malleoli or and/or right fifth metatarsal tenderness.  No proximal tibial tenderness.      Neurological:  Normal sensation, no weakness. Ambulating without assistance with steady gait.      Diagnostics   Radiographs:  XR foot right 3+ views   Final Result   No acute fracture or dislocation. Please note that the AP view is   limited due to motion degradation.        Mild dorsal forefoot soft tissue swelling.        MACRO:   None        Signed by: Clark Jenkins 9/22/2024 5:34 PM   Dictation workstation:   [id number]      XR ankle right 3+ views   Final Result   No acute fracture or dislocation. Please note that the AP view is   limited due to motion degradation.        Mild dorsal forefoot soft tissue swelling.        MACRO:   None        Signed by: Clark Jenkins 9/22/2024 5:34 PM   Dictation workstation:   [id number]      XR tibia fibula right 2 views   Final Result   No acute fracture or dislocation. Please note that the AP view is   limited due to motion degradation.        Mild dorsal forefoot soft tissue swelling.        MACRO:   None        Signed by: Clark Jenkins 9/22/2024 5:34 PM   Dictation workstation:   [id number]          ED Course   Visit Vitals  /72 (BP Location: Left arm, Patient Position: Sitting)   Pulse (!) 117   Temp 37 °C (98.6 °F) (Temporal)   Resp 16   Ht 1.803 m (5' 11\")   Wt 84.4 kg (186 lb) "   SpO2 98%   BMI 25.94 kg/m²   Smoking Status Never   BSA 2.06 m²     Medications   ibuprofen tablet 600 mg (600 mg oral Given 9/22/24 1656)       Medical Decision Making     Diagnoses as of 09/22/24 1756   Foot sprain, right, initial encounter   +NVI.  X-ray negative for acute fractures and or dislocations.  Patient is provided with Ace wrap and crutches for comfort. Rest, Ice, Compress, and Elevate your injury as often as possible.  Rx for motrin and tylenol as needed. Referred to ortho if pain persists >1 week.    Final Impression      1. Foot sprain, right, initial encounter          DISPOSITION  Disposition: discharge  Patient condition is: Stable    Comment: Please note this report has been produced using speech recognition software and may contain errors related to that system including errors in grammar, punctuation, and spelling, as well as words and phrases that may be inappropriate.  If there are any questions or concerns please feel free to contact the dictating provider for clarification.    RODERICK Raymundo PA-C  09/22/24 4648

## 2024-09-22 NOTE — DISCHARGE INSTRUCTIONS
Xray is normal - no fractures or dislocations  Rest, Ice, Compress, and Elevate your injury as often as possible.  Take pain medication as needed.  Follow up with orthopedic clinic if pain persists for more than 1 week.

## 2024-10-18 ENCOUNTER — OFFICE VISIT (OUTPATIENT)
Dept: BEHAVIORAL HEALTH | Facility: CLINIC | Age: 24
End: 2024-10-18
Payer: COMMERCIAL

## 2024-10-18 ENCOUNTER — APPOINTMENT (OUTPATIENT)
Dept: BEHAVIORAL HEALTH | Facility: CLINIC | Age: 24
End: 2024-10-18
Payer: COMMERCIAL

## 2024-10-18 VITALS
WEIGHT: 183 LBS | TEMPERATURE: 97.8 F | SYSTOLIC BLOOD PRESSURE: 113 MMHG | HEART RATE: 77 BPM | RESPIRATION RATE: 18 BRPM | DIASTOLIC BLOOD PRESSURE: 65 MMHG | BODY MASS INDEX: 25.52 KG/M2

## 2024-10-18 DIAGNOSIS — F84.0 AUTISM (HHS-HCC): ICD-10-CM

## 2024-10-18 DIAGNOSIS — F90.0 ATTENTION DEFICIT HYPERACTIVITY DISORDER (ADHD), PREDOMINANTLY INATTENTIVE TYPE: Primary | ICD-10-CM

## 2024-10-18 DIAGNOSIS — G47.9 SLEEP DISTURBANCE: ICD-10-CM

## 2024-10-18 DIAGNOSIS — F32.2 CURRENT SEVERE EPISODE OF MAJOR DEPRESSIVE DISORDER WITHOUT PSYCHOTIC FEATURES WITHOUT PRIOR EPISODE (MULTI): ICD-10-CM

## 2024-10-18 DIAGNOSIS — F41.1 GAD (GENERALIZED ANXIETY DISORDER): ICD-10-CM

## 2024-10-18 DIAGNOSIS — Z79.899 ENCOUNTER FOR LONG-TERM (CURRENT) USE OF MEDICATIONS: ICD-10-CM

## 2024-10-18 LAB
AMPHETAMINES UR QL SCN: NORMAL
BARBITURATES UR QL SCN: NORMAL
BENZODIAZ UR QL SCN: NORMAL
BZE UR QL SCN: NORMAL
CANNABINOIDS UR QL SCN: NORMAL
FENTANYL+NORFENTANYL UR QL SCN: NORMAL
METHADONE UR QL SCN: NORMAL
OPIATES UR QL SCN: NORMAL
OXYCODONE+OXYMORPHONE UR QL SCN: NORMAL
PCP UR QL SCN: NORMAL

## 2024-10-18 PROCEDURE — 80307 DRUG TEST PRSMV CHEM ANLYZR: CPT

## 2024-10-18 PROCEDURE — 90832 PSYTX W PT 30 MINUTES: CPT | Performed by: COUNSELOR

## 2024-10-18 RX ORDER — LORAZEPAM 0.5 MG/1
0.5 TABLET ORAL DAILY PRN
Qty: 30 TABLET | Refills: 0 | Status: SHIPPED | OUTPATIENT
Start: 2024-10-18

## 2024-10-18 ASSESSMENT — PAIN SCALES - GENERAL: PAINLEVEL_OUTOF10: 0-NO PAIN

## 2024-10-18 NOTE — PROGRESS NOTES
ASSESSMENT:                                                                                      Mr. Leonard presents at best baseline mental health wise.   See treatment plan below.     PLAN:   problems treated   f/u requested to prevent relapse   medications renewed/re-ordered     Continue methylphenidate ER (Concerta) 18 mg by mouth every morning for ADHD. I have reviewed the report 10/18/2024. I have considered the risk for abuse and diversion.   Stim Agreement signed 9/18/2024  Continue lorazepam (Ativan) 0.5 mg by mouth daily as needed for anxiety  [facility] Agreement signed 9/18/2024  Continue Vitamin D3 1000 units by mouth dinner with food for vit d def. April 2024 vit D 27.7 (31-80).    Continue fluoxetine (Prozac) 40 mg by mouth daily for depression and anxiety.  Continue melatonin 9 mg by mouth at bedtime for sleep disturbances. 4 mg in the past not effective.  Continue trazodone 50 mg by mouth at bedtime, may take an additional 50 mg by mouth as needed for sleep.  Risks, benefits, alternatives, off-label uses, and side effects of medications have been discussed with patient/caregiver. There is no report of signs/symptoms consistent with medication-induced impairment in daily functioning. At this time, benefits of medication felt to outweigh potential risks. Will continue to reassess need for psychotropic medication at regular 3-6 month intervals.  Return to clinic Friday 11/15/24 at 10 AM in person or earlier if needed. Call [phone] to reschedule.  9. Urine methylphenidate confirmation and drug screen ordered.   Thank you for seeing me today. If you have any questions or concerns, do not hesitate to contact my office.  Leonor Barker  TREATMENT TYPE                                                                                     counseling and coordination of care; addressing signs and symptoms of illness; risks/benefits and side effects of medications; and behavioral approaches to illness.  This  note was created using electronic dictation. There may be errors in syntax and meaning. Please contact the office with any questions.
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
## 2024-10-18 NOTE — PATIENT INSTRUCTIONS
ASSESSMENT:                                                                                      Mr. Leonard presents at best baseline mental health wise.   See treatment plan below.     PLAN:   problems treated   f/u requested to prevent relapse   medications renewed/re-ordered     Continue methylphenidate ER (Concerta) 18 mg by mouth every morning for ADHD. I have reviewed the report 10/18/2024. I have considered the risk for abuse and diversion.   Stim Agreement signed 9/18/2024  Continue lorazepam (Ativan) 0.5 mg by mouth daily as needed for anxiety  [facility] Agreement signed 9/18/2024  Continue Vitamin D3 1000 units by mouth dinner with food for vit d def. April 2024 vit D 27.7 (31-80).    Continue fluoxetine (Prozac) 40 mg by mouth daily for depression and anxiety.  Continue melatonin 9 mg by mouth at bedtime for sleep disturbances. 4 mg in the past not effective.  Continue trazodone 50 mg by mouth at bedtime, may take an additional 50 mg by mouth as needed for sleep.  Risks, benefits, alternatives, off-label uses, and side effects of medications have been discussed with patient/caregiver. There is no report of signs/symptoms consistent with medication-induced impairment in daily functioning. At this time, benefits of medication felt to outweigh potential risks. Will continue to reassess need for psychotropic medication at regular 3-6 month intervals.  Return to clinic Friday 11/15/24 at 10 AM in person or earlier if needed. Call [phone] to reschedule.  9. Urine methylphenidate confirmation and drug screen ordered.   Thank you for seeing me today. If you have any questions or concerns, do not hesitate to contact my office.  Leonor Barker  TREATMENT TYPE                                                                                     counseling and coordination of care; addressing signs and symptoms of illness; risks/benefits and side effects of medications; and behavioral approaches to illness.  This  note was created using electronic dictation. There may be errors in syntax and meaning. Please contact the office with any questions.

## 2024-10-18 NOTE — PROGRESS NOTES
Total Time: 34 min  Diagnosis: MDD, Anxiety, ASD  Visit Type: in person  Reason for visit: managing his mood and worry     - notes things are better, his anxiety is better with meds  - did not want to talk about the previous incident; notes he did have a panic attack at a field trip as well  - notes he is “talking to a girl” but wants to take it slow as he is not ready for a full relationship  - notes sleep is better  - initially wanted to quit but they offered a job but they are waiting to understand how it will impact his waiver    focused on:  - positive self talk, coping skills, sleep hygiene   - continue with family communication skills  - STOP think, think before you act

## 2024-10-25 LAB
ME-PHENIDATE UR-MCNC: 468.2 NG/ML
PPAA UR-MCNC: >5000 NG/ML

## 2024-11-15 ENCOUNTER — APPOINTMENT (OUTPATIENT)
Dept: BEHAVIORAL HEALTH | Facility: CLINIC | Age: 24
End: 2024-11-15
Payer: COMMERCIAL

## 2024-11-15 VITALS
WEIGHT: 178.9 LBS | RESPIRATION RATE: 18 BRPM | SYSTOLIC BLOOD PRESSURE: 109 MMHG | HEART RATE: 80 BPM | TEMPERATURE: 97.8 F | DIASTOLIC BLOOD PRESSURE: 65 MMHG | BODY MASS INDEX: 24.95 KG/M2

## 2024-11-15 DIAGNOSIS — F32.2 CURRENT SEVERE EPISODE OF MAJOR DEPRESSIVE DISORDER WITHOUT PSYCHOTIC FEATURES WITHOUT PRIOR EPISODE (MULTI): ICD-10-CM

## 2024-11-15 DIAGNOSIS — G47.9 SLEEP DISTURBANCE: ICD-10-CM

## 2024-11-15 DIAGNOSIS — Z79.899 ENCOUNTER FOR LONG-TERM (CURRENT) USE OF MEDICATIONS: ICD-10-CM

## 2024-11-15 DIAGNOSIS — F90.0 ATTENTION DEFICIT HYPERACTIVITY DISORDER (ADHD), PREDOMINANTLY INATTENTIVE TYPE: ICD-10-CM

## 2024-11-15 DIAGNOSIS — F84.0 AUTISM (HHS-HCC): ICD-10-CM

## 2024-11-15 DIAGNOSIS — F41.1 GAD (GENERALIZED ANXIETY DISORDER): ICD-10-CM

## 2024-11-15 DIAGNOSIS — E55.9 VITAMIN D DEFICIENCY: ICD-10-CM

## 2024-11-15 PROCEDURE — 99215 OFFICE O/P EST HI 40 MIN: CPT | Performed by: NURSE PRACTITIONER

## 2024-11-15 RX ORDER — METHYLPHENIDATE HYDROCHLORIDE 18 MG/1
18 TABLET ORAL EVERY MORNING
Qty: 30 TABLET | Refills: 0 | Status: SHIPPED | OUTPATIENT
Start: 2024-11-15 | End: 2024-12-15

## 2024-11-15 RX ORDER — LORAZEPAM 0.5 MG/1
0.5 TABLET ORAL DAILY PRN
Qty: 30 TABLET | Refills: 0 | Status: SHIPPED | OUTPATIENT
Start: 2024-11-15

## 2024-11-15 RX ORDER — TRAZODONE HYDROCHLORIDE 50 MG/1
TABLET ORAL
Qty: 180 TABLET | Refills: 1 | Status: SHIPPED | OUTPATIENT
Start: 2024-11-15

## 2024-11-15 RX ORDER — TALC
9 POWDER (GRAM) TOPICAL NIGHTLY PRN
Qty: 270 TABLET | Refills: 1 | Status: SHIPPED | OUTPATIENT
Start: 2024-11-15 | End: 2025-05-14

## 2024-11-15 RX ORDER — METHYLPHENIDATE HYDROCHLORIDE 18 MG/1
18 TABLET ORAL EVERY MORNING
Qty: 30 TABLET | Refills: 0 | Status: SHIPPED | OUTPATIENT
Start: 2024-12-15 | End: 2025-01-14

## 2024-11-15 RX ORDER — CHOLECALCIFEROL (VITAMIN D3) 25 MCG
1000 TABLET ORAL DAILY
Qty: 90 TABLET | Refills: 3 | Status: SHIPPED | OUTPATIENT
Start: 2024-11-15 | End: 2025-11-15

## 2024-11-15 RX ORDER — FLUOXETINE HYDROCHLORIDE 40 MG/1
40 CAPSULE ORAL DAILY
Qty: 90 CAPSULE | Refills: 1 | Status: SHIPPED | OUTPATIENT
Start: 2024-11-15

## 2024-11-15 RX ORDER — METHYLPHENIDATE HYDROCHLORIDE 18 MG/1
18 TABLET ORAL EVERY MORNING
Qty: 30 TABLET | Refills: 0 | Status: SHIPPED | OUTPATIENT
Start: 2025-01-14 | End: 2025-02-13

## 2024-11-15 ASSESSMENT — PAIN SCALES - GENERAL: PAINLEVEL_OUTOF10: 0-NO PAIN
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
## 2024-11-15 NOTE — PATIENT INSTRUCTIONS
ASSESSMENT:                                                                                      Mr. Leonard presents at best baseline mental health wise.   See treatment plan below.     PLAN:   problems treated   f/u requested to prevent relapse   medications renewed/re-ordered     Continue methylphenidate ER (Concerta) 18 mg by mouth every morning for ADHD. I have reviewed the report 11/15/2024. I have considered the risk for abuse and diversion.   [facility] Agreement signed 9/18/2024  10/18/2024 Methylphenidate and drug screen completed   Continue lorazepam (Ativan) 0.5 mg by mouth daily as needed for anxiety  [facility] Agreement signed 9/18/2024  Continue Vitamin D3 1000 units by mouth dinner with food for vit d def. April 2024 vit D 27.7 (31-80).    Continue fluoxetine (Prozac) 40 mg by mouth daily for depression and anxiety.  Continue melatonin 9 mg by mouth at bedtime for sleep disturbances. 4 mg in the past not effective.  Continue trazodone 50 mg by mouth at bedtime, may take an additional 50 mg by mouth as needed for sleep.  Risks, benefits, alternatives, off-label uses, and side effects of medications have been discussed with patient/caregiver. There is no report of signs/symptoms consistent with medication-induced impairment in daily functioning. At this time, benefits of medication felt to outweigh potential risks. Will continue to reassess need for psychotropic medication at regular intervals.  Return to clinic Friday 12/27/24 at 8 AM in person or earlier if needed. Call [phone] to reschedule.    Thank you for seeing me today. If you have any questions or concerns, do not hesitate to contact my office.  Leonor Barker  TREATMENT TYPE                                                                                     counseling and coordination of care; addressing signs and symptoms of illness; risks/benefits and side effects of medications; and behavioral approaches to illness.  This note was created using  electronic dictation. There may be errors in syntax and meaning. Please contact the office with any questions.

## 2024-12-13 ENCOUNTER — APPOINTMENT (OUTPATIENT)
Dept: BEHAVIORAL HEALTH | Facility: CLINIC | Age: 24
End: 2024-12-13
Payer: COMMERCIAL

## 2024-12-13 DIAGNOSIS — F32.2 CURRENT SEVERE EPISODE OF MAJOR DEPRESSIVE DISORDER WITHOUT PSYCHOTIC FEATURES WITHOUT PRIOR EPISODE (MULTI): ICD-10-CM

## 2024-12-13 DIAGNOSIS — F84.0 AUTISM (HHS-HCC): ICD-10-CM

## 2024-12-13 DIAGNOSIS — F41.1 GAD (GENERALIZED ANXIETY DISORDER): ICD-10-CM

## 2024-12-13 PROCEDURE — 90834 PSYTX W PT 45 MINUTES: CPT | Performed by: COUNSELOR

## 2024-12-13 NOTE — PROGRESS NOTES
"Total Time: 48 min  Diagnosis: MDD, Anxiety, ASD  Visit Type: in person  Reason for visit: managing his mood and worry      - notes he is worrying about everything, when asked for more details, he notes he was supposed to get his gf a ring and his parents got an Xbox and now he is worried about money all the time  - reports as a results he is struggling with \"responsibility\"  - talked about work and his recent pay (one week he was over paid, the ramifications of getting underpaid the next time)  - reports before thanksgiving feeling really bad but didn't tell anyone bc he didn't want \"to get taken away\"; dad seemed shocked and totally unaware, also seemed to think he and the gf broke up (story about guilt, rush into relationship)       focused on:  - talked about prioritizing and how to order things and needs vs wants (balance SSI, his job money, where the money goes)   - positive self talk, coping skills, sleep hygiene  - continue with family communication skills; write things down, what does he want to say, what is the desired outcome, etc  - STOP think, think before you act    "

## 2024-12-27 ENCOUNTER — APPOINTMENT (OUTPATIENT)
Dept: BEHAVIORAL HEALTH | Facility: CLINIC | Age: 24
End: 2024-12-27
Payer: COMMERCIAL

## 2024-12-27 DIAGNOSIS — F41.1 GAD (GENERALIZED ANXIETY DISORDER): ICD-10-CM

## 2024-12-27 DIAGNOSIS — F84.0 AUTISM (HHS-HCC): ICD-10-CM

## 2024-12-27 DIAGNOSIS — F84.0 AUTISM SPECTRUM DISORDER REQUIRING SUBSTANTIAL SUPPORT (LEVEL 2) (HHS-HCC): ICD-10-CM

## 2024-12-27 DIAGNOSIS — G47.9 SLEEP DISTURBANCE: ICD-10-CM

## 2024-12-27 DIAGNOSIS — F90.0 ATTENTION DEFICIT HYPERACTIVITY DISORDER (ADHD), PREDOMINANTLY INATTENTIVE TYPE: Primary | ICD-10-CM

## 2024-12-27 DIAGNOSIS — F32.2 CURRENT SEVERE EPISODE OF MAJOR DEPRESSIVE DISORDER WITHOUT PSYCHOTIC FEATURES WITHOUT PRIOR EPISODE (MULTI): ICD-10-CM

## 2024-12-27 PROCEDURE — 99215 OFFICE O/P EST HI 40 MIN: CPT | Performed by: NURSE PRACTITIONER

## 2024-12-27 RX ORDER — LORAZEPAM 0.5 MG/1
0.5 TABLET ORAL DAILY PRN
Qty: 30 TABLET | Refills: 2 | Status: SHIPPED | OUTPATIENT
Start: 2024-12-27

## 2024-12-27 RX ORDER — METHYLPHENIDATE HYDROCHLORIDE 18 MG/1
18 TABLET ORAL EVERY MORNING
Qty: 30 TABLET | Refills: 0 | Status: SHIPPED | OUTPATIENT
Start: 2025-01-26 | End: 2025-02-25

## 2024-12-27 RX ORDER — METHYLPHENIDATE HYDROCHLORIDE 18 MG/1
18 TABLET ORAL EVERY MORNING
Qty: 30 TABLET | Refills: 0 | Status: SHIPPED | OUTPATIENT
Start: 2024-12-27 | End: 2025-01-26

## 2024-12-27 RX ORDER — METHYLPHENIDATE HYDROCHLORIDE 18 MG/1
18 TABLET ORAL EVERY MORNING
Qty: 30 TABLET | Refills: 0 | Status: SHIPPED | OUTPATIENT
Start: 2025-02-25 | End: 2025-03-27

## 2024-12-27 NOTE — PATIENT INSTRUCTIONS
ASSESSMENT:                                                                                      Mr. Leonard presents at baseline mental health wise, with the exception of late night awakening to eat. Discussed SE of Methylphenidate. Discussed eating healthy snack later in the evening and taking second dose of Trazodone.  See treatment plan below.     PLAN:   problems treated   f/u requested to prevent relapse   medications renewed/re-ordered     Continue methylphenidate ER (Concerta) 18 mg by mouth every morning for ADHD. I have reviewed the report 12/27/2024. I have considered the risk for abuse and diversion.   Stim Agreement signed 9/18/2024  10/18/2024 Methylphenidate and drug screen completed   Continue lorazepam (Ativan) 0.5 mg by mouth daily as needed for anxiety  [facility] Agreement signed 9/18/2024  Continue Vitamin D3 1000 units by mouth dinner with food for vit d def. April 2024 vit D 27.7 (31-80).    Continue fluoxetine (Prozac) 40 mg by mouth daily for depression and anxiety.  Continue melatonin 9 mg by mouth at bedtime for sleep disturbances. 4 mg in the past not effective.  Continue trazodone 50 mg by mouth at bedtime, may take an additional 50 mg by mouth as needed for sleep.  Risks, benefits, alternatives, off-label uses, and side effects of medications have been discussed with patient/caregiver. There is no report of signs/symptoms consistent with medication-induced impairment in daily functioning. At this time, benefits of medication felt to outweigh potential risks. Will continue to reassess need for psychotropic medication at regular intervals.  Return to clinic 1-2 months in person or earlier if needed. Call [phone] to reschedule.    Thank you for seeing me today. If you have any questions or concerns, do not hesitate to contact my office.  Leonor Barker  TREATMENT TYPE                                                                                     counseling and coordination of care;  addressing signs and symptoms of illness; risks/benefits and side effects of medications; and behavioral approaches to illness.  This note was created using electronic dictation. There may be errors in syntax and meaning. Please contact the office with any questions.
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
## 2025-02-07 ENCOUNTER — APPOINTMENT (OUTPATIENT)
Dept: BEHAVIORAL HEALTH | Facility: CLINIC | Age: 25
End: 2025-02-07
Payer: COMMERCIAL

## 2025-02-07 DIAGNOSIS — F41.1 GAD (GENERALIZED ANXIETY DISORDER): ICD-10-CM

## 2025-02-07 DIAGNOSIS — F84.0 AUTISM (HHS-HCC): ICD-10-CM

## 2025-02-07 DIAGNOSIS — F32.2 CURRENT SEVERE EPISODE OF MAJOR DEPRESSIVE DISORDER WITHOUT PSYCHOTIC FEATURES WITHOUT PRIOR EPISODE (MULTI): ICD-10-CM

## 2025-02-07 PROCEDURE — 90834 PSYTX W PT 45 MINUTES: CPT | Performed by: COUNSELOR

## 2025-02-07 NOTE — PROGRESS NOTES
"Total Time: 45 min  Diagnosis: MDD, Anxiety, ASD  Visit Type: in person  Reason for visit: managing his mood and worry       - talked a little about his bday, noted some sadness that its not as a big deal  - talked some about work, issues with the  (one was let go) so they are down staff, and he is getting home at 5p vs 3p, so its making for a longer day  - notes he broke up with his gf; notes things continued to feel uncomfortable and not go in the right direction; maintains the friendship with her brother  - continued to talk about finances, decision making; feelings of guilt   - dad still reports bouts of anxiety (ex/ didn't want to ride the bus for his bday, gets up at night to eat, dad thinks its a \"1\" but he makes its a \"10\")       focused on:  - continue to work on prioritizing, decision making, needs vs wants, family communication skills   - positive self talk, coping skills, sleep hygiene  - STOP think, think before you act  "

## 2025-02-12 ENCOUNTER — APPOINTMENT (OUTPATIENT)
Dept: BEHAVIORAL HEALTH | Facility: CLINIC | Age: 25
End: 2025-02-12
Payer: COMMERCIAL

## 2025-02-12 VITALS
SYSTOLIC BLOOD PRESSURE: 104 MMHG | BODY MASS INDEX: 25.91 KG/M2 | RESPIRATION RATE: 16 BRPM | DIASTOLIC BLOOD PRESSURE: 69 MMHG | HEART RATE: 68 BPM | WEIGHT: 185.8 LBS | TEMPERATURE: 98.6 F

## 2025-02-12 DIAGNOSIS — F84.0 AUTISM SPECTRUM DISORDER REQUIRING SUBSTANTIAL SUPPORT (LEVEL 2) (HHS-HCC): ICD-10-CM

## 2025-02-12 DIAGNOSIS — F32.2 CURRENT SEVERE EPISODE OF MAJOR DEPRESSIVE DISORDER WITHOUT PSYCHOTIC FEATURES WITHOUT PRIOR EPISODE (MULTI): ICD-10-CM

## 2025-02-12 DIAGNOSIS — F41.1 GAD (GENERALIZED ANXIETY DISORDER): ICD-10-CM

## 2025-02-12 DIAGNOSIS — F90.0 ATTENTION DEFICIT HYPERACTIVITY DISORDER (ADHD), PREDOMINANTLY INATTENTIVE TYPE: ICD-10-CM

## 2025-02-12 DIAGNOSIS — G47.9 SLEEP DISTURBANCE: ICD-10-CM

## 2025-02-12 PROCEDURE — 99215 OFFICE O/P EST HI 40 MIN: CPT | Performed by: NURSE PRACTITIONER

## 2025-02-12 RX ORDER — LORAZEPAM 0.5 MG/1
0.5 TABLET ORAL DAILY PRN
Qty: 30 TABLET | Refills: 2 | Status: SHIPPED | OUTPATIENT
Start: 2025-02-12

## 2025-02-12 RX ORDER — METHYLPHENIDATE HYDROCHLORIDE 18 MG/1
18 TABLET ORAL EVERY MORNING
Qty: 30 TABLET | Refills: 0 | Status: SHIPPED | OUTPATIENT
Start: 2025-04-13 | End: 2025-05-13

## 2025-02-12 RX ORDER — METHYLPHENIDATE HYDROCHLORIDE 18 MG/1
18 TABLET ORAL EVERY MORNING
Qty: 30 TABLET | Refills: 0 | Status: SHIPPED | OUTPATIENT
Start: 2025-03-14 | End: 2025-04-13

## 2025-02-12 RX ORDER — METHYLPHENIDATE HYDROCHLORIDE 18 MG/1
18 TABLET ORAL EVERY MORNING
Qty: 30 TABLET | Refills: 0 | Status: SHIPPED | OUTPATIENT
Start: 2025-02-12 | End: 2025-03-14

## 2025-02-12 ASSESSMENT — COLUMBIA-SUICIDE SEVERITY RATING SCALE - C-SSRS
6. HAVE YOU EVER DONE ANYTHING, STARTED TO DO ANYTHING, OR PREPARED TO DO ANYTHING TO END YOUR LIFE?: NO
SUICIDE, SINCE LAST CONTACT: NO
6. HAVE YOU EVER DONE ANYTHING, STARTED TO DO ANYTHING, OR PREPARED TO DO ANYTHING TO END YOUR LIFE?: NO
1. SINCE LAST CONTACT, HAVE YOU WISHED YOU WERE DEAD OR WISHED YOU COULD GO TO SLEEP AND NOT WAKE UP?: NO
2. HAVE YOU ACTUALLY HAD ANY THOUGHTS OF KILLING YOURSELF?: NO
2. HAVE YOU ACTUALLY HAD ANY THOUGHTS OF KILLING YOURSELF?: NO
TOTAL  NUMBER OF ABORTED OR SELF INTERRUPTED ATTEMPTS SINCE LAST CONTACT: NO
ATTEMPT SINCE LAST CONTACT: NO
TOTAL  NUMBER OF INTERRUPTED ATTEMPTS LIFETIME: NO
1. HAVE YOU WISHED YOU WERE DEAD OR WISHED YOU COULD GO TO SLEEP AND NOT WAKE UP?: NO
TOTAL  NUMBER OF ABORTED OR SELF INTERRUPTED ATTEMPTS LIFETIME: NO
TOTAL  NUMBER OF INTERRUPTED ATTEMPTS SINCE LAST CONTACT: NO
ATTEMPT LIFETIME: NO

## 2025-02-12 ASSESSMENT — PAIN SCALES - GENERAL: PAINLEVEL_OUTOF10: 0-NO PAIN

## 2025-02-12 NOTE — PATIENT INSTRUCTIONS
ASSESSMENT:                                                                                      Mr. Leonard presents at baseline mental health wise, with the exception of varying sleep. May sleep after work (dt migraine) or late night awakening to eat. Transportation after work, loud and takes up to 1 hour 50 minutes. Has been taking lorazepam nightly (no concerns).   Moods good on weekends.   Family will trial Trazodone 150 mg at bedtime. Will review next appt.  Discussed meds should be taken in same 2 hour window, regardless of whether a work day or not.  See treatment plan below.     PLAN:   problems treated   f/u requested to prevent relapse   medications renewed/re-ordered    Recommend OTC pain relief (Excedrin, Tylenol, etc) by mouth at 2 PM for prophylactic treatment.  Continue methylphenidate ER (Concerta) 18 mg by mouth every morning for ADHD. I have reviewed the report 2/12/25. I have considered the risk for abuse and diversion.   Stim Agreement signed 9/18/2024  10/18/2024 Methylphenidate and drug screen completed   Continue lorazepam (Ativan) 0.5 mg by mouth daily as needed for anxiety  [facility] Agreement signed 9/18/2024  Drug screen completed 10/18/24    Following meds filled until 4/15/25  Continue Vitamin D3 1000 units by mouth dinner with food for vit d def. April 2024 vit D 27.7 (31-80).    Continue fluoxetine (Prozac) 40 mg by mouth daily for depression and anxiety.  Continue melatonin 9 mg by mouth at bedtime for sleep disturbances. 4 mg in the past not effective.  Continue trazodone 50 mg by mouth at bedtime, may take an additional 50 mg by mouth as needed for sleep.  Risks, benefits, alternatives, off-label uses, and side effects of medications have been discussed with patient/caregiver. There is no report of signs/symptoms consistent with medication-induced impairment in daily functioning. At this time, benefits of medication felt to outweigh potential risks. Will continue to reassess need for  psychotropic medication at regular intervals.  Return to clinic 1-2 months in person or earlier if needed. Call [phone] to reschedule.    Thank you for seeing me today. If you have any questions or concerns, do not hesitate to contact my office.  Leonor Barker  TREATMENT TYPE                                                                                     counseling and coordination of care; addressing signs and symptoms of illness; risks/benefits and side effects of medications; and behavioral approaches to illness.  This note was created using electronic dictation. There may be errors in syntax and meaning. Please contact the office with any questions.
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
## 2025-03-14 ENCOUNTER — APPOINTMENT (OUTPATIENT)
Dept: BEHAVIORAL HEALTH | Facility: CLINIC | Age: 25
End: 2025-03-14
Payer: COMMERCIAL

## 2025-03-14 VITALS
SYSTOLIC BLOOD PRESSURE: 102 MMHG | RESPIRATION RATE: 18 BRPM | BODY MASS INDEX: 24.78 KG/M2 | DIASTOLIC BLOOD PRESSURE: 63 MMHG | TEMPERATURE: 97.8 F | WEIGHT: 177.7 LBS | HEART RATE: 80 BPM

## 2025-03-14 DIAGNOSIS — G47.9 SLEEP DISTURBANCE: ICD-10-CM

## 2025-03-14 DIAGNOSIS — F32.2 CURRENT SEVERE EPISODE OF MAJOR DEPRESSIVE DISORDER WITHOUT PSYCHOTIC FEATURES WITHOUT PRIOR EPISODE (MULTI): ICD-10-CM

## 2025-03-14 DIAGNOSIS — E55.9 VITAMIN D DEFICIENCY: ICD-10-CM

## 2025-03-14 PROCEDURE — 1036F TOBACCO NON-USER: CPT | Performed by: NURSE PRACTITIONER

## 2025-03-14 PROCEDURE — 99215 OFFICE O/P EST HI 40 MIN: CPT | Performed by: NURSE PRACTITIONER

## 2025-03-14 RX ORDER — TRAZODONE HYDROCHLORIDE 50 MG/1
TABLET ORAL
Qty: 180 TABLET | Refills: 1 | Status: SHIPPED | OUTPATIENT
Start: 2025-03-14

## 2025-03-14 RX ORDER — CHOLECALCIFEROL (VITAMIN D3) 25 MCG
1000 TABLET ORAL DAILY
Qty: 90 TABLET | Refills: 1 | Status: SHIPPED | OUTPATIENT
Start: 2025-03-14

## 2025-03-14 RX ORDER — FLUOXETINE HYDROCHLORIDE 40 MG/1
40 CAPSULE ORAL DAILY
Qty: 90 CAPSULE | Refills: 1 | Status: SHIPPED | OUTPATIENT
Start: 2025-03-14

## 2025-03-14 RX ORDER — TALC
9 POWDER (GRAM) TOPICAL NIGHTLY PRN
Qty: 270 TABLET | Refills: 1 | Status: SHIPPED | OUTPATIENT
Start: 2025-03-14 | End: 2025-09-10

## 2025-03-14 ASSESSMENT — COLUMBIA-SUICIDE SEVERITY RATING SCALE - C-SSRS
TOTAL  NUMBER OF INTERRUPTED ATTEMPTS LIFETIME: NO
TOTAL  NUMBER OF ABORTED OR SELF INTERRUPTED ATTEMPTS LIFETIME: NO
ATTEMPT LIFETIME: NO
TOTAL  NUMBER OF INTERRUPTED ATTEMPTS SINCE LAST CONTACT: NO
6. HAVE YOU EVER DONE ANYTHING, STARTED TO DO ANYTHING, OR PREPARED TO DO ANYTHING TO END YOUR LIFE?: NO
2. HAVE YOU ACTUALLY HAD ANY THOUGHTS OF KILLING YOURSELF?: NO
ATTEMPT SINCE LAST CONTACT: NO
SUICIDE, SINCE LAST CONTACT: NO
1. SINCE LAST CONTACT, HAVE YOU WISHED YOU WERE DEAD OR WISHED YOU COULD GO TO SLEEP AND NOT WAKE UP?: NO
1. HAVE YOU WISHED YOU WERE DEAD OR WISHED YOU COULD GO TO SLEEP AND NOT WAKE UP?: NO
TOTAL  NUMBER OF ABORTED OR SELF INTERRUPTED ATTEMPTS SINCE LAST CONTACT: NO
6. HAVE YOU EVER DONE ANYTHING, STARTED TO DO ANYTHING, OR PREPARED TO DO ANYTHING TO END YOUR LIFE?: NO
2. HAVE YOU ACTUALLY HAD ANY THOUGHTS OF KILLING YOURSELF?: NO

## 2025-03-14 ASSESSMENT — PAIN SCALES - GENERAL: PAINLEVEL_OUTOF10: 0-NO PAIN
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
## 2025-03-14 NOTE — PATIENT INSTRUCTIONS
Continue methylphenidate ER (Concerta) 18 mg by mouth every morning for ADHD. I have reviewed the report 3/14/25. I have considered the risk for abuse and diversion.   [facility] Agreement signed 9/18/2024  10/18/2024 Methylphenidate and drug screen completed   Continue lorazepam (Ativan) 0.5 mg by mouth daily as needed for anxiety  [facility] Agreement signed 9/18/2024  Drug screen completed 10/18/24    Following meds filled until 10/14/25  Continue Vitamin D3 1000 units by mouth dinner with food for vit d def. April 2024 vit D 27.7 (31-80).    Continue fluoxetine (Prozac) 40 mg by mouth daily for depression and anxiety.  Continue melatonin 9 mg by mouth at bedtime for sleep disturbances. 4 mg in the past not effective.  Continue trazodone 50 mg by mouth at bedtime, may take an additional 50 mg by mouth as needed for sleep.  Risks, benefits, alternatives, off-label uses, and side effects of medications have been discussed with patient/caregiver. There is no report of signs/symptoms consistent with medication-induced impairment in daily functioning. At this time, benefits of medication felt to outweigh potential risks. Will continue to reassess need for psychotropic medication at regular intervals.  Return to clinic 1-2 months in person or earlier if needed. Call [phone] to reschedule.    Thank you for seeing me today. If you have any questions or concerns, do not hesitate to contact my office.  Leonor Barker  TREATMENT TYPE                                                                                     counseling and coordination of care; addressing signs and symptoms of illness; risks/benefits and side effects of medications; and behavioral approaches to illness.  This note was created using electronic dictation. There may be errors in syntax and meaning. Please contact the office with any questions.

## 2025-04-20 ENCOUNTER — CLINICAL SUPPORT (OUTPATIENT)
Dept: EMERGENCY MEDICINE | Facility: HOSPITAL | Age: 25
End: 2025-04-20
Payer: COMMERCIAL

## 2025-04-20 ENCOUNTER — HOSPITAL ENCOUNTER (EMERGENCY)
Facility: HOSPITAL | Age: 25
Discharge: HOME | End: 2025-04-20
Attending: EMERGENCY MEDICINE
Payer: COMMERCIAL

## 2025-04-20 VITALS
DIASTOLIC BLOOD PRESSURE: 68 MMHG | TEMPERATURE: 97.7 F | OXYGEN SATURATION: 98 % | RESPIRATION RATE: 16 BRPM | SYSTOLIC BLOOD PRESSURE: 111 MMHG | HEART RATE: 98 BPM

## 2025-04-20 DIAGNOSIS — R45.851 SUICIDAL THOUGHTS: Primary | ICD-10-CM

## 2025-04-20 LAB
ALBUMIN SERPL BCP-MCNC: 4.8 G/DL (ref 3.4–5)
ALP SERPL-CCNC: 52 U/L (ref 33–120)
ALT SERPL W P-5'-P-CCNC: 17 U/L (ref 10–52)
AMPHETAMINES UR QL SCN: NORMAL
ANION GAP SERPL CALC-SCNC: 12 MMOL/L (ref 10–20)
APAP SERPL-MCNC: <10 UG/ML (ref ?–30)
AST SERPL W P-5'-P-CCNC: 22 U/L (ref 9–39)
BARBITURATES UR QL SCN: NORMAL
BASOPHILS # BLD AUTO: 0.05 X10*3/UL (ref 0–0.1)
BASOPHILS NFR BLD AUTO: 0.8 %
BENZODIAZ UR QL SCN: NORMAL
BILIRUB SERPL-MCNC: 0.7 MG/DL (ref 0–1.2)
BUN SERPL-MCNC: 12 MG/DL (ref 6–23)
BZE UR QL SCN: NORMAL
CALCIUM SERPL-MCNC: 10.2 MG/DL (ref 8.6–10.6)
CANNABINOIDS UR QL SCN: NORMAL
CHLORIDE SERPL-SCNC: 103 MMOL/L (ref 98–107)
CO2 SERPL-SCNC: 24 MMOL/L (ref 21–32)
CREAT SERPL-MCNC: 1.12 MG/DL (ref 0.5–1.3)
EGFRCR SERPLBLD CKD-EPI 2021: >90 ML/MIN/1.73M*2
EOSINOPHIL # BLD AUTO: 0.05 X10*3/UL (ref 0–0.7)
EOSINOPHIL NFR BLD AUTO: 0.8 %
ERYTHROCYTE [DISTWIDTH] IN BLOOD BY AUTOMATED COUNT: 12.8 % (ref 11.5–14.5)
ETHANOL SERPL-MCNC: <10 MG/DL
FENTANYL+NORFENTANYL UR QL SCN: NORMAL
GLUCOSE SERPL-MCNC: 106 MG/DL (ref 74–99)
HCT VFR BLD AUTO: 45 % (ref 41–52)
HGB BLD-MCNC: 16.2 G/DL (ref 13.5–17.5)
IMM GRANULOCYTES # BLD AUTO: 0.03 X10*3/UL (ref 0–0.7)
IMM GRANULOCYTES NFR BLD AUTO: 0.5 % (ref 0–0.9)
LYMPHOCYTES # BLD AUTO: 1.53 X10*3/UL (ref 1.2–4.8)
LYMPHOCYTES NFR BLD AUTO: 25.6 %
MCH RBC QN AUTO: 30.5 PG (ref 26–34)
MCHC RBC AUTO-ENTMCNC: 36 G/DL (ref 32–36)
MCV RBC AUTO: 85 FL (ref 80–100)
METHADONE UR QL SCN: NORMAL
MONOCYTES # BLD AUTO: 0.54 X10*3/UL (ref 0.1–1)
MONOCYTES NFR BLD AUTO: 9 %
NEUTROPHILS # BLD AUTO: 3.77 X10*3/UL (ref 1.2–7.7)
NEUTROPHILS NFR BLD AUTO: 63.3 %
NRBC BLD-RTO: 0 /100 WBCS (ref 0–0)
OPIATES UR QL SCN: NORMAL
OXYCODONE+OXYMORPHONE UR QL SCN: NORMAL
PCP UR QL SCN: NORMAL
PLATELET # BLD AUTO: 249 X10*3/UL (ref 150–450)
POTASSIUM SERPL-SCNC: 4.1 MMOL/L (ref 3.5–5.3)
PROT SERPL-MCNC: 8 G/DL (ref 6.4–8.2)
RBC # BLD AUTO: 5.32 X10*6/UL (ref 4.5–5.9)
SALICYLATES SERPL-MCNC: <3 MG/DL (ref ?–20)
SODIUM SERPL-SCNC: 135 MMOL/L (ref 136–145)
WBC # BLD AUTO: 6 X10*3/UL (ref 4.4–11.3)

## 2025-04-20 PROCEDURE — 80307 DRUG TEST PRSMV CHEM ANLYZR: CPT | Performed by: PHYSICIAN ASSISTANT

## 2025-04-20 PROCEDURE — 80320 DRUG SCREEN QUANTALCOHOLS: CPT | Performed by: PHYSICIAN ASSISTANT

## 2025-04-20 PROCEDURE — 93005 ELECTROCARDIOGRAM TRACING: CPT

## 2025-04-20 PROCEDURE — 36415 COLL VENOUS BLD VENIPUNCTURE: CPT | Performed by: PHYSICIAN ASSISTANT

## 2025-04-20 PROCEDURE — 99285 EMERGENCY DEPT VISIT HI MDM: CPT | Performed by: PHYSICIAN ASSISTANT

## 2025-04-20 PROCEDURE — 99284 EMERGENCY DEPT VISIT MOD MDM: CPT | Performed by: EMERGENCY MEDICINE

## 2025-04-20 PROCEDURE — 85025 COMPLETE CBC W/AUTO DIFF WBC: CPT | Performed by: PHYSICIAN ASSISTANT

## 2025-04-20 PROCEDURE — 84075 ASSAY ALKALINE PHOSPHATASE: CPT | Performed by: PHYSICIAN ASSISTANT

## 2025-04-20 SDOH — HEALTH STABILITY: MENTAL HEALTH: ACTIVE SUICIDAL IDEATION WITH SOME INTENT TO ACT, WITHOUT SPECIFIC PLAN (PAST 1 MONTH): NO

## 2025-04-20 SDOH — HEALTH STABILITY: MENTAL HEALTH: SUICIDAL BEHAVIOR (3 MONTHS): NO

## 2025-04-20 SDOH — HEALTH STABILITY: MENTAL HEALTH: SUICIDAL BEHAVIOR (LIFETIME): YES

## 2025-04-20 SDOH — HEALTH STABILITY: MENTAL HEALTH: ARE YOU HAVING THOUGHTS OF KILLING YOURSELF RIGHT NOW?: NO

## 2025-04-20 SDOH — HEALTH STABILITY: MENTAL HEALTH: WISH TO BE DEAD (PAST 1 MONTH): NO

## 2025-04-20 SDOH — HEALTH STABILITY: MENTAL HEALTH: HAVE YOU EVER TRIED TO KILL YOURSELF?: NO

## 2025-04-20 SDOH — HEALTH STABILITY: MENTAL HEALTH: ACTIVE SUICIDAL IDEATION WITH SPECIFIC PLAN AND INTENT (PAST 1 MONTH): NO

## 2025-04-20 SDOH — HEALTH STABILITY: MENTAL HEALTH: ANXIETY SYMPTOMS: GENERALIZED

## 2025-04-20 SDOH — HEALTH STABILITY: MENTAL HEALTH: NON-SPECIFIC ACTIVE SUICIDAL THOUGHTS (PAST 1 MONTH): NO

## 2025-04-20 SDOH — HEALTH STABILITY: MENTAL HEALTH: DEPRESSION SYMPTOMS: CHANGE IN ENERGY LEVEL;APPETITE CHANGE

## 2025-04-20 SDOH — HEALTH STABILITY: MENTAL HEALTH: IN THE PAST WEEK, HAVE YOU BEEN HAVING THOUGHTS ABOUT KILLING YOURSELF?: NO

## 2025-04-20 SDOH — ECONOMIC STABILITY: HOUSING INSECURITY: FEELS SAFE LIVING IN HOME: YES

## 2025-04-20 SDOH — HEALTH STABILITY: MENTAL HEALTH

## 2025-04-20 SDOH — HEALTH STABILITY: MENTAL HEALTH: IN THE PAST FEW WEEKS, HAVE YOU WISHED YOU WERE DEAD?: NO

## 2025-04-20 SDOH — ECONOMIC STABILITY: GENERAL

## 2025-04-20 SDOH — HEALTH STABILITY: MENTAL HEALTH: IN THE PAST FEW WEEKS, HAVE YOU FELT THAT YOU OR YOUR FAMILY WOULD BE BETTER OFF IF YOU WERE DEAD?: NO

## 2025-04-20 ASSESSMENT — COLUMBIA-SUICIDE SEVERITY RATING SCALE - C-SSRS
6. HAVE YOU EVER DONE ANYTHING, STARTED TO DO ANYTHING, OR PREPARED TO DO ANYTHING TO END YOUR LIFE?: NO
2. HAVE YOU ACTUALLY HAD ANY THOUGHTS OF KILLING YOURSELF?: YES
1. IN THE PAST MONTH, HAVE YOU WISHED YOU WERE DEAD OR WISHED YOU COULD GO TO SLEEP AND NOT WAKE UP?: YES
4. HAVE YOU HAD THESE THOUGHTS AND HAD SOME INTENTION OF ACTING ON THEM?: YES
5. HAVE YOU STARTED TO WORK OUT OR WORKED OUT THE DETAILS OF HOW TO KILL YOURSELF? DO YOU INTEND TO CARRY OUT THIS PLAN?: YES

## 2025-04-20 ASSESSMENT — LIFESTYLE VARIABLES
SUBSTANCE_ABUSE_PAST_12_MONTHS: NO
PRESCIPTION_ABUSE_PAST_12_MONTHS: NO

## 2025-04-20 NOTE — PROGRESS NOTES
Social Work Note    I have had a discussion with the patient about warning signs that their condition is worsening, and they should consider returning to the ED and/or calling 911    The patient has identified appropriate internal coping strategies and has people and social settings that provide distraction and support.    The patient has a person who they can talk to in a crisis.  I have offered to contact this individual  Yes - Bipin Loenard Sr

## 2025-04-20 NOTE — ED PROVIDER NOTES
Emergency Department Encounter  [facility] EMERGENCY MEDICINE    Patient: Jerry Leonard  MRN: [id number]  : 2000  Date of Evaluation: 2025  ED Provider: Aiyana Reddy PA-C      Chief Complaint       Chief Complaint   Patient presents with    Suicidal     HPI    Jerry Leonard is a 25 y.o. male who presents to the emergency department presenting for suicidal ideation.  Patient reports that he called EMS for himself today after he was having thoughts of jumping out of his apartment building window.  He states that he does feel safe at home, lives at home with his parents.  He would like to call his parents to speak with them about being here today.  He endorses PMH of MDD, REBA, ADHD.  He last took his home medications this morning and endorses that he only took his prescribed amount.  Denies any previous suicide attempts.  Denies any physical complaints today. Denies any acute intoxication.    ROS:     Review of Systems  14 systems reviewed and otherwise acutely negative except as in the HPI.    Past History   Medical History[1]  Surgical History[2]  Social History[3]    Medications/Allergies     Previous Medications    ASPIRIN-ACETAMINOPHEN-CAFFEINE (EXCEDRIN MIGRAINE) 250-250-65 MG TABLET    Take 1 tablet by mouth every 6 hours if needed for headaches.    CHOLECALCIFEROL (VITAMIN D3) 25 MCG (1000 UNITS) TABLET    Take 1 tablet (1,000 Units) by mouth once daily.    FLUOXETINE (PROZAC) 40 MG CAPSULE    Take 1 capsule (40 mg) by mouth once daily.    LORAZEPAM (ATIVAN) 0.5 MG TABLET    Take 1 tablet (0.5 mg) by mouth once daily as needed for anxiety.    MELATONIN 3 MG TABLET    Take 3 tablets (9 mg) by mouth as needed at bedtime for sleep.    METHYLPHENIDATE ER (CONCERTA) 18 MG EXTENDED RELEASE TABLET    Take 1 tablet (18 mg) by mouth once daily in the morning. Do not crush, chew, or split. Do not fill before 2025.    METHYLPHENIDATE ER (CONCERTA) 18 MG EXTENDED RELEASE TABLET     Take 1 tablet (18 mg) by mouth once daily in the morning. Do not crush, chew, or split. Do not fill before January 26, 2025.    METHYLPHENIDATE ER (CONCERTA) 18 MG EXTENDED RELEASE TABLET    Take 1 tablet (18 mg) by mouth once daily in the morning. Do not crush, chew, or split. Do not fill before February 25, 2025.    METHYLPHENIDATE ER (CONCERTA) 18 MG EXTENDED RELEASE TABLET    Take 1 tablet (18 mg) by mouth once daily in the morning. Do not crush, chew, or split.    METHYLPHENIDATE ER (CONCERTA) 18 MG EXTENDED RELEASE TABLET    Take 1 tablet (18 mg) by mouth once daily in the morning. Do not crush, chew, or split. Do not fill before March 14, 2025.    METHYLPHENIDATE ER (CONCERTA) 18 MG EXTENDED RELEASE TABLET    Take 1 tablet (18 mg) by mouth once daily in the morning. Do not crush, chew, or split. Do not fill before April 13, 2025.    TRAZODONE (DESYREL) 50 MG TABLET    Take 50 mg by mouth at bedtime, may take an additional 50 mg by mouth as needed.     Allergies[4]     Physical Exam       ED Triage Vitals [04/20/25 1402]   Temperature Heart Rate Respirations BP   36.5 °C (97.7 °F) 98 16 111/68      Pulse Ox Temp Source Heart Rate Source Patient Position   98 % Temporal -- --      BP Location FiO2 (%)     -- --         Physical Exam    Physical Exam:     VS: As documented in the triage note and EMR flowsheet from this visit were reviewed.    Appearance: Alert, oriented, cooperative, in no acute distress. Well nourished & well hydrated.    Skin: Warm, intact and dry.    Neck: Supple, without lymphadenopathy.    Pulmonary: Clear bilaterally with good chest wall excursion. No rales, rhonchi or wheezing. No accessory muscle use or stridor.     Cardiac: Normal S1, S2 without murmur, rub, gallop or extrasystole.    Abdomen: Soft, nontender, active bowel sounds. No rebound or guarding.     Musculoskeletal: Spontaneously moving all extremities without limitation. Extremities warm and well-perfused, capillary refill  less than 2 seconds. Pulses full and equal.     Neurological:  Cranial nerves II through XII are grossly intact.    Psychiatric: Appropriate mood and affect. Kempt appearance. Does not appear internally stimulated.      Diagnostics   Labs:  Labs Reviewed   COMPREHENSIVE METABOLIC PANEL - Abnormal       Result Value    Glucose 106 (*)     Sodium 135 (*)     Potassium 4.1      Chloride 103      Bicarbonate 24      Anion Gap 12      Urea Nitrogen 12      Creatinine 1.12      eGFR >90      Calcium 10.2      Albumin 4.8      Alkaline Phosphatase 52      Total Protein 8.0      AST 22      Bilirubin, Total 0.7      ALT 17     DRUG SCREEN,URINE - Normal    Amphetamine Screen, Urine Presumptive Negative      Barbiturate Screen, Urine Presumptive Negative      Benzodiazepines Screen, Urine Presumptive Negative      Cannabinoid Screen, Urine Presumptive Negative      Cocaine Metabolite Screen, Urine Presumptive Negative      Fentanyl Screen, Urine Presumptive Negative      Opiate Screen, Urine Presumptive Negative      Oxycodone Screen, Urine Presumptive Negative      PCP Screen, Urine Presumptive Negative      Methadone Screen, Urine Presumptive Negative      Narrative:     Drug screen results are presumptive and should not be used to assess   compliance with prescribed medication. Contact the performing [facility] laboratory   to add-on definitive confirmatory testing if clinically indicated.    Toxicology screening results are reported qualitatively. The concentration must   be greater than or equal to the cutoff to be reported as positive. The concentration   at which the screening test can detect an individual drug or metabolite varies.   The absence of expected drug(s) and/or drug metabolite(s) may indicate non-compliance,   inappropriate timing of specimen collection relative to drug administration, poor drug   absorption, diluted/adulterated urine, or limitations of testing. For medical purposes   only; not valid for  forensic use.    Interpretive questions should be directed to the laboratory medical directors.   ACUTE TOXICOLOGY PANEL, BLOOD - Normal    Acetaminophen <10.0      Salicylate  <3      Alcohol <10     CBC WITH AUTO DIFFERENTIAL    WBC 6.0      nRBC 0.0      RBC 5.32      Hemoglobin 16.2      Hematocrit 45.0      MCV 85      MCH 30.5      MCHC 36.0      RDW 12.8      Platelets 249      Neutrophils % 63.3      Immature Granulocytes %, Automated 0.5      Lymphocytes % 25.6      Monocytes % 9.0      Eosinophils % 0.8      Basophils % 0.8      Neutrophils Absolute 3.77      Immature Granulocytes Absolute, Automated 0.03      Lymphocytes Absolute 1.53      Monocytes Absolute 0.54      Eosinophils Absolute 0.05      Basophils Absolute 0.05       EKG #1  Date/Time: 04/20/25 1420  Interpreted by: Dr. GABRIEL Thacker  NSR. Normal Qtc interval. No STEMI.      ED Course   Visit Vitals  /68   Pulse 98   Temp 36.5 °C (97.7 °F) (Temporal)   Resp 16   SpO2 98%   Smoking Status Never     Medications - No data to display    Medical Decision Making   Pt belongings secured, placed on continuous observation here. Calm + cooperative, no need for chemical or physical restraint. Basic labs and EKG obtained for medical clearance for EPAT evaluation.  EKG NSR.  Pt provided with Ipad and listed phone number of father in chart.  Labs are benign, discussed with patient and mother at bedside.    MEDICALLY CLEAR FOR EPAT EVALUATION AT THIS TIME    EPAT has evaluated and reports patient does NOT meet inpatient psychiatric admission criteria. Patient discharged home with mother at bedside. Please call [phone] for Primary Care referral for follow up appointments.    Final Impression      1. Suicidal thoughts          DISPOSITION  Disposition: discharge  Patient condition is: Stable    Comment: Please note this report has been produced using speech recognition software and may contain errors related to that system including errors in grammar,  punctuation, and spelling, as well as words and phrases that may be inappropriate.  If there are any questions or concerns please feel free to contact the dictating provider for clarification.    Aiyana Reddy PA-C         [1] History reviewed. No pertinent past medical history.  [2] History reviewed. No pertinent surgical history.  [3]   Social History  Socioeconomic History    Marital status: Single   Tobacco Use    Smoking status: Never     Passive exposure: Never    Smokeless tobacco: Never   Vaping Use    Vaping status: Never Used   Substance and Sexual Activity    Alcohol use: Never    Drug use: Never   [4]   Allergies  Allergen Reactions    Cat's Claw Unknown    House Dust Unknown        Aiyana Reddy PA-C  04/20/25 0220

## 2025-04-20 NOTE — Clinical Note
Jerry Leonard was seen and treated in our emergency department on 4/20/2025.  He may return to work on 04/22/2025.       If you have any questions or concerns, please don't hesitate to call.      Bahman Thacker MD

## 2025-04-20 NOTE — PROGRESS NOTES
"EPAT - Social Work Psychiatric Assessment    Arrival Details  Mode of Arrival: Ambulance  Admission Source: Emergency department  Admission Type: Voluntary  EPAT Assessment Start Date: 04/20/25  EPAT Assessment Start Time: 1745  Name of : MARIN Montes, ROSELIA    History of Present Illness  Admission Reason: Psychiatric Eval  HPI: Charts states \"Jerry Leonard is a 25 y.o. male who presents to the emergency department presenting for suicidal ideation.  Patient reports that he called EMS for himself today after he was having thoughts of jumping out of his apartment building window.  He states that he does feel safe at home, lives at home with his parents.  He would like to call his parents to speak with them about being here today.  He endorses PMH of MDD, REBA, ADHD.  He last took his home medications this morning and endorses that he only took his prescribed amount.  Denies any previous suicide attempts.  Denies any physical complaints today. Denies any acute intoxication.\" [facility] provider, review med list. Phx of 1 psych admission related to SI; April 2024, had thoughts of jumping from a bridge. Pt report going to the bridge, however stopping self prior to jumping. UTOX normal, BAL normal. High risk. Provider, triage, [place] and extern al notes reviewed.         Psychiatric Symptoms  Anxiety Symptoms: Generalized  Depression Symptoms: Change in energy level, Appetite change  Patricia Symptoms: No problems reported or observed.    Psychosis Symptoms  Hallucination Type: No problems reported or observed. (Denies AVH/commands)  Delusion Type: No problems reported or observed.    Additional Symptoms - Adult  Generalized Anxiety Disorder: Difficult to control worry, Difficulity concentrating, Excessive anxiety/worry  Obsessive Compulsive Disorder: No problems reported or observed.  Panic Attack: Shortness of breath (heart racing.)  Post Traumatic Stress Disorder: No problems reported or " observed.  Delirium: No problems reported or observed.  Review of Symptoms Comments: Review above.    Past Psychiatric History/Meds/Treatments  Past Psychiatric History: Last psych admission April 2024, had thoughts of jumping from a bridge. Pt report going to the bridge, however stopping self prior to jumping. Admitted to [facility].  Past Psychiatric Meds/Treatments: Ativan,Concerta, melatonin, and prozac.  Past Violence/Victimization History: Denies    Current Mental Health Contacts   Name/Phone Number: N/A   Last Appointment Date: N/A  Provider Name/Phone Number: [facility]  Provider Last Appointment Date: next appt friday    Support System: Immediate family, Friends, Community    Living Arrangement: Lives with someone (mom, dad, and 5 other siblings.)    Home Safety  Feels Safe Living in Home: Yes  Potentially Unsafe Housing Conditions: Unable to Assess  Home Safety : Review above.    Income Information  Employment Status for: Patient  Employment Status: Employed  Income Source: Employed  Current/Previous Occupation:  ([employer], employed and attend.)  Shift Worked: First Shift  Income/Expense Information: Income meets expenses  Financial Concerns: None  Who Manages Finances if Patient Unable: Guardian/father  Employment/ Finance Comments: Review above.    [place] Service/Education History  Current or Previous  Service: None   Experience: Other (Comment)  Education Level: High school, IEP  History of Learning Problems: Yes  History of School Behavior Problems: No  School History: Review above.    Social/Cultural History  Social History: Social Hx:   Dx: ADHD, MDD, REBA, Autism   Residence: lives with mom and dad  Children: 0  Social support: friend, mom and dad.   Stressors: relationship conflicts,   Employment/financial: [employer] work and employed, couple of months. Receives SSI   status: Denies  Guardian/POA/payee: Bipin Leonard  Legal: has  guardian  Trauma: Denies  Coping skills: talking to brother, playing game, photography,  Cultural Requests During Hospitalization:   Spiritual Requests During Hospitalization: Spirtual  Important Activities: Family, Social    Legal  Legal Considerations: Patient/ Family Ability to Make Healthcare Decisions  Assistance with Managing/Advocating Healthcare Needs: Legal Guardian  Criminal Activity/ Legal Involvement Pertinent to Current Situation/ Hospitalization: Denies  Legal Concerns: Denies  Legal Comments: Denies    Drug Screening  Have you used any substances (canabis, cocaine, heroin, hallucinogens, inhalants, etc.) in the past 12 months?: No  Have you used any prescription drugs other than prescribed in the past 12 months?: No  Is a toxicology screen needed?: Yes         Behavioral Health  Behavioral Health(WDL): Within Defined Limits    Orientation  Orientation Level: Oriented X4    General Appearance  Motor Activity: Unremarkable  Speech Pattern: Other (Comment) (Unremarkable)  General Attitude: Cooperative, Pleasant  Appearance/Hygiene: Unremarkable    Thought Process  Coherency: [place] thinking  Content: Unremarkable  Delusions: Other (Comment)  Perception: Not altered  Hallucination: None  Judgment/Insight: Poor  Confusion: None  Cognition: Appropriate attention/concentration, Appropriate for developmental age, Appropriate judgement    Sleep Pattern  Sleep Pattern: Sleeps all night    Risk Factors  Self Harm/Suicidal Ideation Plan: ED for passive SI, now reporting no SI, intent or plan.  Previous Self Harm/Suicidal Plans: April 2024, had thoughts of jumping from a bridge. Pt report going to the bridge, however stopping self prior to jumping.  Risk Factors: Male, Major mental illness, Lower IQ  Description of Thoughts/Ideas Leaving Unit Now: Report to have been having a moment and tends to say these things when active in emotions per pt report.    Violence Risk Assessment  Assessment of  Violence: None noted  Thoughts of Harm to Others: No    Ability to Assess Risk Screen  Risk Screen - Ability to Assess: Able to be screened  Ask Suicide-Screening Questions  1. In the past few weeks, have you wished you were dead?: No  2. In the past few weeks, have you felt that you or your family would be better off if you were dead?: No  3. In the past week, have you been having thoughts about killing yourself?: No  4. Have you ever tried to kill yourself?: No (April 2024 admission, standing at bridge but later decided not to go through with thoughts.)  5. Are you having thoughts of killing yourself right now?: No  Calculated Risk Score: No intervention is necessary  [place] Suicide Severity Rating Scale (Screener/Recent Self-Report)  1. Wish to be Dead (Past 1 Month): No  2. Non-Specific Active Suicidal Thoughts (Past 1 Month): No  3. Active Suicidal Ideation with any Methods (Not Plan) Without Intent to Act (Past 1 Month): No  4. Active Suicidal Ideation with Some Intent to Act, Without Specific Plan (Past 1 Month): No  5. Active Suicidal Ideation with Specific Plan and Intent (Past 1 Month): No  6. Suicidal Behavior (Lifetime): Yes  6. Suicidal Behavior (3 Months): No  6. Suicidal Behavior (Description): Review above April 2024 incideent.  Calculated C-SSRS Risk Score (Lifetime/Recent): Moderate Risk  Step 1: Risk Factors  Current & Past Psychiatric Dx: Mood disorder, Psychotic disorder, ADHD  Presenting Symptoms: Impulsivity  Family History: Other (Comment) (Denies)  Precipitants/Stressors: Triggering events leading to humiliation, shame, and/or despair (e.g. loss of relationship, financial or health status) (real or anticipated)  Access to Lethal Methods : No  Step 2: Protective Factors   Protective Factors Internal: Identifies reasons for living, Buddhism beliefs, Fear of death or the actual act of killing self  Protective Factors External: Cultural, spiritual and/or moral attitudes against suicide,  Supportive social network or family or friends, Beloved pets, Positive therapeutic relationships, Engaged in work or school  Step 3: Suicidal Ideation Intensity  Most Severe Suicidal Ideation Identified: April 2024,  had thoughts of jumping from a bridge. Pt report going to the bridge, however stopping self prior to jumping.  How Many Times Have You Had These Thoughts: Less than once a week  When You Have the Thoughts How Long do They Last : Fleeting - few seconds or minutes  Could/Can You Stop Thinking About Killing Yourself or Wanting to Die if You Want to: Easily able to control thoughts  Are There Things - Anyone or Anything - That Stopped You From Wanting to Die or Acting on: Deterrents probably stopped you  What Sort of Reasons Did You Have For Thinking About Wanting to Die or Killing Yourself: Mostly to get attention, revenge, or a reaction from others  Total Score: 7  Step 5: Documentation  Risk Level: Low suicide risk    Psychiatric Impression and Plan of Care  Assessment and Plan: Upon assessment pt was calm, cooperative, pleasant AOx4, observed laughing and smiling. Pt report being seen in ED for passive SI, no intent or plan. Pt dx Autism,  MDD, REBA, ADHD which contributes to his impulsivity. Mom at bedside report that pt is usually able to be redirected however called prior to taking his medication which usually calms him. Pt report that he is not typically suicidal, however relational strains typically spikes his impulsivity and REBA. Pt denies current anxiety or depression symptoms, AVH, gun access, legal/criminal concerns, self harm behavior, reason to die, HI,  status, substance use, trauma. Pt resides with both his parents; father is deemed guardian. Pt engaged with [employer] services, employed and attending Adult day service with social support.     Guardian reports pt has been doing well with therapist and working on speaking out when he is having a really bad day or time. While today was bad and  thoughts came, he did call 911 which was beneficial to father but definitely worrisome as pt has had suicide attempt in past and SI while standing near bridge. Mother felt there was something wrong with patient and he initially said nothing. Patient has been somewhat despondent today. Ativan used about 3-4x a week, seems to help with anxiety. Father denies knowing of any stressors, but unsure. Father denies there was any preparation behaviors. Has an appointment this Friday and an appointment 5/7/25 for psych appointment. Father is proud that pt told him about his issues, is making pt aware that he is doing the right thing.  In agreeance of discharge    Autism  Specific Resources Provided to Patient: Recommendation for  discharge.  CM Notified: N/A  PHP/IOP Recommended: N/A  Specific Information Provided for PHP/IOP: N/A  Plan Comments: Review above.    Outcome/Disposition  Patient's Perception of Outcome Achieved: Patient is satified with recommendation.  Assessment, Recommendations and Risk Level Reviewed with: Bahman Thacker MD  Contact Name: Bipin Leonard Sr/ Kiesha Leonard  Contact Number(s): psychiatrist spoke with/inperson  Contact Relationship: Guardian/Patient  EPAT Assessment Completed Date: 04/20/25  EPAT Assessment Completed Time: 1908

## 2025-04-21 LAB
ATRIAL RATE: 82 BPM
P AXIS: 74 DEGREES
P OFFSET: 213 MS
P ONSET: 157 MS
PR INTERVAL: 124 MS
Q ONSET: 219 MS
QRS COUNT: 13 BEATS
QRS DURATION: 88 MS
QT INTERVAL: 320 MS
QTC CALCULATION(BAZETT): 373 MS
QTC FREDERICIA: 355 MS
R AXIS: 50 DEGREES
T AXIS: 59 DEGREES
T OFFSET: 379 MS
VENTRICULAR RATE: 82 BPM

## 2025-04-23 NOTE — PROGRESS NOTES
Returned guardians call, Thien Leonard, [phone]  Malena Redd NP student present with consent  States that Jerry was at the  emergency room on 4/20/2025 for suicidal ideation.  Reports after talking to Jerry yesterday, discovered that he talks to supervisor at work, Porsche Lew, often.  Thien Leonard then talked to Evonne Lew and discovered that Jerry has disclosed a lot of information to her.  Miss Lew states that the male (brother of his ex-girlfriend) has been flirting/leaving him on and then will reject him.  This has been causing him a lot of turmoil.    Guardian/father Thien Leonard gave verbal permission to discuss with pastora Lew  [phone].  Release of information form sent to guardian's email:  [email or web address]       Jerry is currently at work.  He returns home around 4:30 PM.  His cell phone [phone].  Guardian is aware this clinician will also update Jerry's therapist, Ciara Macias, whom he has an appointment with in 2 days.    Time on phone 14: 10        
Tibial, PT, and P ocluded with minimum distal flow.

## 2025-04-25 ENCOUNTER — APPOINTMENT (OUTPATIENT)
Dept: BEHAVIORAL HEALTH | Facility: CLINIC | Age: 25
End: 2025-04-25
Payer: COMMERCIAL

## 2025-04-25 DIAGNOSIS — F41.1 GAD (GENERALIZED ANXIETY DISORDER): ICD-10-CM

## 2025-04-25 DIAGNOSIS — F84.0 AUTISM (HHS-HCC): ICD-10-CM

## 2025-04-25 DIAGNOSIS — F32.2 CURRENT SEVERE EPISODE OF MAJOR DEPRESSIVE DISORDER WITHOUT PSYCHOTIC FEATURES WITHOUT PRIOR EPISODE (MULTI): ICD-10-CM

## 2025-04-25 PROCEDURE — 90834 PSYTX W PT 45 MINUTES: CPT | Performed by: COUNSELOR

## 2025-04-25 NOTE — PROGRESS NOTES
"Total Time: 45 min  Diagnosis: MDD, Anxiety, ASD  Visit Type: in person  Reason for visit: managing his mood and worry       - when asked how he has been, he said \"good\", he was challenged since he was just in the hospital for SI  - he was also challenged a bit bc he was disclosing to someone at work and made it seem like he was not really telling her much  - when asked about the actual thoughts/feelings; he stated they \"came out of nowhere\" when asked if that was true, he stated yes  - attempted to \"link the chain together\" the events that lead to the ER (not in trouble, no judgement); is he tired, was it the holiday, etc was it one really big thing or small things (gave example of spending all the money, nude photo or just a lot of really small things which is usually the case)  - talked about a male friend and worries people will  him for being a bad friend and it just \"pushed things over the edge\"  - began talking about his sexuality       focused on:  - active listening  - DBT chaining  - coping skills, boundaries     "

## 2025-05-01 ENCOUNTER — HOSPITAL ENCOUNTER (EMERGENCY)
Facility: HOSPITAL | Age: 25
Discharge: HOME | End: 2025-05-01
Payer: COMMERCIAL

## 2025-05-01 VITALS
TEMPERATURE: 98.2 F | HEIGHT: 71 IN | OXYGEN SATURATION: 99 % | BODY MASS INDEX: 24.78 KG/M2 | DIASTOLIC BLOOD PRESSURE: 79 MMHG | SYSTOLIC BLOOD PRESSURE: 115 MMHG | HEART RATE: 79 BPM | WEIGHT: 177 LBS | RESPIRATION RATE: 16 BRPM

## 2025-05-01 DIAGNOSIS — S61.211A LACERATION OF LEFT INDEX FINGER WITHOUT FOREIGN BODY WITHOUT DAMAGE TO NAIL, INITIAL ENCOUNTER: Primary | ICD-10-CM

## 2025-05-01 PROCEDURE — 12001 RPR S/N/AX/GEN/TRNK 2.5CM/<: CPT | Performed by: NURSE PRACTITIONER

## 2025-05-01 PROCEDURE — 90471 IMMUNIZATION ADMIN: CPT | Performed by: NURSE PRACTITIONER

## 2025-05-01 PROCEDURE — 90715 TDAP VACCINE 7 YRS/> IM: CPT | Mod: JZ | Performed by: NURSE PRACTITIONER

## 2025-05-01 PROCEDURE — 99283 EMERGENCY DEPT VISIT LOW MDM: CPT

## 2025-05-01 PROCEDURE — 99284 EMERGENCY DEPT VISIT MOD MDM: CPT | Performed by: NURSE PRACTITIONER

## 2025-05-01 PROCEDURE — 2500000004 HC RX 250 GENERAL PHARMACY W/ HCPCS (ALT 636 FOR OP/ED): Mod: JZ | Performed by: NURSE PRACTITIONER

## 2025-05-01 PROCEDURE — 2500000004 HC RX 250 GENERAL PHARMACY W/ HCPCS (ALT 636 FOR OP/ED)

## 2025-05-01 RX ORDER — LIDOCAINE HYDROCHLORIDE 10 MG/ML
INJECTION, SOLUTION INFILTRATION; PERINEURAL
Status: COMPLETED
Start: 2025-05-01 | End: 2025-05-01

## 2025-05-01 RX ORDER — LIDOCAINE HYDROCHLORIDE 10 MG/ML
5 INJECTION, SOLUTION INFILTRATION; PERINEURAL ONCE
Status: COMPLETED | OUTPATIENT
Start: 2025-05-01 | End: 2025-05-01

## 2025-05-01 RX ADMIN — LIDOCAINE HYDROCHLORIDE 5 ML: 10 INJECTION, SOLUTION INFILTRATION; PERINEURAL at 10:01

## 2025-05-01 RX ADMIN — TETANUS TOXOID, REDUCED DIPHTHERIA TOXOID AND ACELLULAR PERTUSSIS VACCINE, ADSORBED 0.5 ML: 5; 2.5; 8; 8; 2.5 SUSPENSION INTRAMUSCULAR at 10:04

## 2025-05-01 ASSESSMENT — COLUMBIA-SUICIDE SEVERITY RATING SCALE - C-SSRS
2. HAVE YOU ACTUALLY HAD ANY THOUGHTS OF KILLING YOURSELF?: NO
6. HAVE YOU EVER DONE ANYTHING, STARTED TO DO ANYTHING, OR PREPARED TO DO ANYTHING TO END YOUR LIFE?: NO
1. IN THE PAST MONTH, HAVE YOU WISHED YOU WERE DEAD OR WISHED YOU COULD GO TO SLEEP AND NOT WAKE UP?: NO

## 2025-05-01 ASSESSMENT — PAIN - FUNCTIONAL ASSESSMENT: PAIN_FUNCTIONAL_ASSESSMENT: 0-10

## 2025-05-01 ASSESSMENT — LIFESTYLE VARIABLES
HAVE PEOPLE ANNOYED YOU BY CRITICIZING YOUR DRINKING: NO
EVER FELT BAD OR GUILTY ABOUT YOUR DRINKING: NO
HAVE YOU EVER FELT YOU SHOULD CUT DOWN ON YOUR DRINKING: NO
EVER HAD A DRINK FIRST THING IN THE MORNING TO STEADY YOUR NERVES TO GET RID OF A HANGOVER: NO
TOTAL SCORE: 0

## 2025-05-01 ASSESSMENT — PAIN SCALES - GENERAL: PAINLEVEL_OUTOF10: 6

## 2025-05-01 NOTE — Clinical Note
Jerry Leonard was seen and treated in our emergency department on 5/1/2025.  He may return to work on 05/02/2025.       If you have any questions or concerns, please don't hesitate to call.      Edna Hernandez, MARGRET-CNP

## 2025-05-01 NOTE — ED TRIAGE NOTES
"Pt presents with glass in his left index finger, per mom pt stated he \"threw a glass bottle or something\", pt denies SI or HI, avoids eye contact when asked, did not want to say how he cut his finger when asked  "

## 2025-05-01 NOTE — ED PROCEDURE NOTE
Procedure  Laceration Repair    Performed by: DORY Mcdonald  Authorized by: DORY Mcdonald    Consent:     Consent obtained:  Verbal    Consent given by:  Patient    Risks discussed:  Pain and infection  Universal protocol:     Procedure explained and questions answered to patient or proxy's satisfaction: yes      Immediately prior to procedure, a time out was called: yes      Patient identity confirmed:  Verbally with patient  Anesthesia:     Anesthesia method:  Local infiltration    Local anesthetic:  Lidocaine 1% w/o epi  Laceration details:     Location:  Finger    Finger location:  L index finger    Length (cm):  1.5  Pre-procedure details:     Preparation:  Patient was prepped and draped in usual sterile fashion  Exploration:     Limited defect created (wound extended): no      Imaging outcome: foreign body not noted      Wound exploration: wound explored through full range of motion and entire depth of wound visualized      Wound extent: no foreign body, no nerve damage, no tendon damage and no vascular damage      Contaminated: no    Treatment:     Area cleansed with:  Povidone-iodine    Amount of cleaning:  Standard    Irrigation solution:  Tap water    Irrigation method:  Tap    Debridement:  None  Skin repair:     Repair method:  Sutures    Suture size:  6-0    Suture material:  Nylon    Suture technique:  Simple interrupted    Number of sutures:  4  Approximation:     Approximation:  Close  Repair type:     Repair type:  Simple  Post-procedure details:     Dressing:  Adhesive bandage    Procedure completion:  Tolerated               DORY Mcdonald  05/01/25 1023

## 2025-05-01 NOTE — ED PROVIDER NOTES
Emergency Department Encounter  [facility] EMERGENCY MEDICINE    Patient: Jerry Leonard  MRN: [id number]  : 2000  Date of Evaluation: 2025  ED Provider: DORY Mcdonald      Chief Complaint       Chief Complaint   Patient presents with    Finger Laceration     [place]       Limitations to History: None   Historian: Patient, Family,   Records reviewed: EMR inpatient and outpatient notes, Care Everywhere    Jerry Leonard is a 25 y.o. male who presents to the emergency department complaining of laceration to the index finger.  Last night the patient was upset and broke a glass bottle he cut himself on the glass.  He states the laceration is uncomfortable he does not think that he has any glass in the wound and no foreign objects.  Mom was able to place a bandage on the wound and has controlled bleeding.  He denies any fever he denies any other injury.  His tetanus shot is not up-to-date and this will be updated today.  Both mom and the patient have no other concerns today.    ROS:     Review of Systems  All other systems have been reviewed and are otherwise acutely negative except as in the [place].    Past History   Medical History[1]  Surgical History[2]    Medications/Allergies     Previous Medications    ASPIRIN-ACETAMINOPHEN-CAFFEINE (EXCEDRIN MIGRAINE) 250-250-65 MG TABLET    Take 1 tablet by mouth every 6 hours if needed for headaches.    CHOLECALCIFEROL (VITAMIN D3) 25 MCG (1000 UNITS) TABLET    Take 1 tablet (1,000 Units) by mouth once daily.    FLUOXETINE (PROZAC) 40 MG CAPSULE    Take 1 capsule (40 mg) by mouth once daily.    LORAZEPAM (ATIVAN) 0.5 MG TABLET    Take 1 tablet (0.5 mg) by mouth once daily as needed for anxiety.    MELATONIN 3 MG TABLET    Take 3 tablets (9 mg) by mouth as needed at bedtime for sleep.    METHYLPHENIDATE ER (CONCERTA) 18 MG EXTENDED RELEASE TABLET    Take 1 tablet (18 mg) by mouth once daily in the morning. Do not crush, chew, or split. Do not fill  before January 14, 2025.    METHYLPHENIDATE ER (CONCERTA) 18 MG EXTENDED RELEASE TABLET    Take 1 tablet (18 mg) by mouth once daily in the morning. Do not crush, chew, or split. Do not fill before January 26, 2025.    METHYLPHENIDATE ER (CONCERTA) 18 MG EXTENDED RELEASE TABLET    Take 1 tablet (18 mg) by mouth once daily in the morning. Do not crush, chew, or split. Do not fill before February 25, 2025.    METHYLPHENIDATE ER (CONCERTA) 18 MG EXTENDED RELEASE TABLET    Take 1 tablet (18 mg) by mouth once daily in the morning. Do not crush, chew, or split.    METHYLPHENIDATE ER (CONCERTA) 18 MG EXTENDED RELEASE TABLET    Take 1 tablet (18 mg) by mouth once daily in the morning. Do not crush, chew, or split. Do not fill before March 14, 2025.    METHYLPHENIDATE ER (CONCERTA) 18 MG EXTENDED RELEASE TABLET    Take 1 tablet (18 mg) by mouth once daily in the morning. Do not crush, chew, or split. Do not fill before April 13, 2025.    TRAZODONE (DESYREL) 50 MG TABLET    Take 50 mg by mouth at bedtime, may take an additional 50 mg by mouth as needed.     Allergies[3]     Physical Exam       ED Triage Vitals [05/01/25 0908]   Temperature Heart Rate Respirations BP   36.8 °C (98.2 °F) 79 16 115/79      Pulse Ox Temp Source Heart Rate Source Patient Position   99 % Temporal Monitor Sitting      BP Location FiO2 (%)     Right arm --         Physical Exam    GENERAL:  The patient appears nourished and normally developed. Vital signs as documented.     HEENT:  Head normocephalic, atraumatic, EOMs intact, PERRLA, Mucous membranes moist. Nares patent without copious rhinorrhea.     PULMONARY:  Lungs are clear to auscultation, without any respiratory distress. Able to speak full sentences, no accessory muscle use    CARDIAC:   Normal rate. No murmurs, rubs or gallops    MUSCULOSKELETAL:   No tenderness of cervical, thoracic and lumbar spine, no step off or deformity noted,  Able to ambulate, Non edematous, with no obvious  "deformities    SKIN:   Good color, with no significant rashes on exposed skin a flap laceration is noted on the index finger with no nailbed involvement bleeding is controlled no foreign body is noted in the wound no tendon involvement patient has good sensation on both sides of the finger and the tip of the finger full range of motion of each joint of the finger no palpable bony deformity         Assessment   In brief, Jerry Leoanrd is a 25 y.o. male who presented to the emergency department for a laceration to his left index finger.  The laceration was cleaned with soap and water as well as Betadine no foreign body is noted no tendon involvement.  Interrupted sutures of 6-0 nylon placed patient tolerated well will block with 1% lidocaine was performed with good anesthesia.  Patient will keep the area clean and dry with soap and water and sutures will come out in 7-10.  Both mom and the patient verbalized understanding of plan of care and are agreeable    ED Course     Diagnoses as of 05/01/25 1017   Laceration of left index finger without foreign body without damage to nail, initial encounter       Visit Vitals  /79 (BP Location: Right arm, Patient Position: Sitting)   Pulse 79   Temp 36.8 °C (98.2 °F) (Temporal)   Resp 16   Ht 1.803 m (5' 11\")   Wt 80.3 kg (177 lb)   SpO2 99%   BMI 24.69 kg/m²   Smoking Status Never   BSA 2.01 m²       Medications   lidocaine (Xylocaine) 10 mg/mL (1 %) injection 5 mL (5 mL infiltration Given 5/1/25 1001)   diphth,pertus(acell),tetanus (BoostRIX) 2.5-8-5 Lf-mcg-Lf/0.5mL vaccine 0.5 mL (0.5 mL intramuscular Given 5/1/25 1004)       Plan of care discussed, patient verbalizes understanding of plan of care and is in agreement.      Final Impression      1. Laceration of left index finger without foreign body without damage to nail, initial encounter          DISPOSITION  Disposition: Discharge  Patient condition is: Stable    Comment: Please note this report has been produced " using speech recognition software and may contain errors related to that system including errors in grammar, punctuation, and spelling, as well as words and phrases that may be inappropriate.  If there are any questions or concerns please feel free to contact the dictating provider for clarification.    MARGRET Mcdonald-CNP       [1] History reviewed. No pertinent past medical history.  [2] History reviewed. No pertinent surgical history.  [3]   Allergies  Allergen Reactions    Cat's Claw Unknown    House Dust Unknown        DORY Mcdonald  05/01/25 1017       DORY Mcdonald  05/01/25 1018

## 2025-05-07 ENCOUNTER — APPOINTMENT (OUTPATIENT)
Dept: BEHAVIORAL HEALTH | Facility: CLINIC | Age: 25
End: 2025-05-07
Payer: COMMERCIAL

## 2025-05-16 ENCOUNTER — APPOINTMENT (OUTPATIENT)
Dept: BEHAVIORAL HEALTH | Facility: CLINIC | Age: 25
End: 2025-05-16
Payer: COMMERCIAL

## 2025-05-16 VITALS
BODY MASS INDEX: 24.24 KG/M2 | WEIGHT: 173.8 LBS | DIASTOLIC BLOOD PRESSURE: 69 MMHG | RESPIRATION RATE: 18 BRPM | SYSTOLIC BLOOD PRESSURE: 105 MMHG | TEMPERATURE: 97.8 F | HEART RATE: 81 BPM

## 2025-05-16 DIAGNOSIS — F90.0 ATTENTION DEFICIT HYPERACTIVITY DISORDER (ADHD), PREDOMINANTLY INATTENTIVE TYPE: ICD-10-CM

## 2025-05-16 DIAGNOSIS — F41.1 GAD (GENERALIZED ANXIETY DISORDER): ICD-10-CM

## 2025-05-16 PROCEDURE — 99215 OFFICE O/P EST HI 40 MIN: CPT | Performed by: NURSE PRACTITIONER

## 2025-05-16 RX ORDER — METHYLPHENIDATE HYDROCHLORIDE 18 MG/1
18 TABLET ORAL EVERY MORNING
Qty: 30 TABLET | Refills: 0 | Status: SHIPPED | OUTPATIENT
Start: 2025-05-16 | End: 2025-06-15

## 2025-05-16 RX ORDER — LORAZEPAM 0.5 MG/1
0.5 TABLET ORAL DAILY PRN
Qty: 30 TABLET | Refills: 2 | Status: SHIPPED | OUTPATIENT
Start: 2025-05-16

## 2025-05-16 RX ORDER — METHYLPHENIDATE HYDROCHLORIDE 18 MG/1
18 TABLET ORAL EVERY MORNING
Qty: 30 TABLET | Refills: 0 | Status: SHIPPED | OUTPATIENT
Start: 2025-07-15 | End: 2025-08-14

## 2025-05-16 RX ORDER — METHYLPHENIDATE HYDROCHLORIDE 18 MG/1
18 TABLET ORAL EVERY MORNING
Qty: 30 TABLET | Refills: 0 | Status: SHIPPED | OUTPATIENT
Start: 2025-06-15 | End: 2025-07-15

## 2025-05-16 ASSESSMENT — COLUMBIA-SUICIDE SEVERITY RATING SCALE - C-SSRS
TOTAL  NUMBER OF INTERRUPTED ATTEMPTS SINCE LAST CONTACT: NO
ATTEMPT SINCE LAST CONTACT: NO
TOTAL  NUMBER OF ABORTED OR SELF INTERRUPTED ATTEMPTS LIFETIME: NO
2. HAVE YOU ACTUALLY HAD ANY THOUGHTS OF KILLING YOURSELF?: NO
SUICIDE, SINCE LAST CONTACT: NO
6. HAVE YOU EVER DONE ANYTHING, STARTED TO DO ANYTHING, OR PREPARED TO DO ANYTHING TO END YOUR LIFE?: NO
6. HAVE YOU EVER DONE ANYTHING, STARTED TO DO ANYTHING, OR PREPARED TO DO ANYTHING TO END YOUR LIFE?: NO
ATTEMPT LIFETIME: NO
1. SINCE LAST CONTACT, HAVE YOU WISHED YOU WERE DEAD OR WISHED YOU COULD GO TO SLEEP AND NOT WAKE UP?: NO
TOTAL  NUMBER OF ABORTED OR SELF INTERRUPTED ATTEMPTS SINCE LAST CONTACT: NO
TOTAL  NUMBER OF INTERRUPTED ATTEMPTS LIFETIME: NO
2. HAVE YOU ACTUALLY HAD ANY THOUGHTS OF KILLING YOURSELF?: NO
1. HAVE YOU WISHED YOU WERE DEAD OR WISHED YOU COULD GO TO SLEEP AND NOT WAKE UP?: NO

## 2025-05-16 ASSESSMENT — PAIN SCALES - GENERAL: PAINLEVEL_OUTOF10: 0-NO PAIN

## 2025-05-16 NOTE — PATIENT INSTRUCTIONS
Recommend OTC pain relief (Excedrin, Tylenol, etc) by mouth at 2 PM for prophylactic treatment.  Continue methylphenidate ER (Concerta) 18 mg by mouth every morning for ADHD. I have reviewed the report 5/15/25. I have considered the risk for abuse and diversion.   [facility] Agreement signed 9/18/2024  10/18/2024 Methylphenidate and drug screen completed   Continue lorazepam (Ativan) 0.5 mg by mouth daily as needed for anxiety  [place] Agreement signed 9/18/2024  Drug screen completed 10/18/24    Following meds filled until 10/14/25  Continue Vitamin D3 1000 units by mouth dinner with food for vit d def. April 2024 vit D 27.7 (31-80).    Continue fluoxetine (Prozac) 40 mg by mouth daily for depression and anxiety.  Continue melatonin 9 mg by mouth at bedtime for sleep disturbances. 4 mg in the past not effective.  Continue trazodone 50 mg by mouth at bedtime, may take an additional 50 mg by mouth as needed for sleep.  Risks, benefits, alternatives, off-label uses, and side effects of medications have been discussed with patient/caregiver. There is no report of signs/symptoms consistent with medication-induced impairment in daily functioning. At this time, benefits of medication felt to outweigh potential risks. Will continue to reassess need for psychotropic medication at regular intervals.  Return to clinic 2 weeks in person or earlier if needed. Call [phone] to reschedule.    Thank you for seeing me today. If you have any questions or concerns, do not hesitate to contact my office.  Leonor Barker  TREATMENT TYPE                                                                                     counseling and coordination of care; addressing signs and symptoms of illness; risks/benefits and side effects of medications; and behavioral approaches to illness.  This note was created using electronic dictation. There may be errors in syntax and meaning. Please contact the office with any questions.
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
## 2025-05-30 ENCOUNTER — APPOINTMENT (OUTPATIENT)
Dept: BEHAVIORAL HEALTH | Facility: CLINIC | Age: 25
End: 2025-05-30
Payer: COMMERCIAL

## 2025-05-30 VITALS
BODY MASS INDEX: 23.33 KG/M2 | WEIGHT: 167.25 LBS | DIASTOLIC BLOOD PRESSURE: 59 MMHG | HEART RATE: 58 BPM | SYSTOLIC BLOOD PRESSURE: 106 MMHG | TEMPERATURE: 98.5 F

## 2025-05-30 DIAGNOSIS — F33.2 MDD (MAJOR DEPRESSIVE DISORDER), RECURRENT SEVERE, WITHOUT PSYCHOSIS (MULTI): ICD-10-CM

## 2025-05-30 PROCEDURE — 99215 OFFICE O/P EST HI 40 MIN: CPT | Performed by: NURSE PRACTITIONER

## 2025-05-30 NOTE — PROGRESS NOTES
ASSESSMENT:                                                                                      Mr. Leonard presents with paxton abrams   with 2 friendships. Resulting in   monthly to alternate with Therapist Ciara. Therefore, he will be seen Q 2 weeks.  Need clarified if taking trazodone 150 mg at bedtime as discussed at last appt.  See treatment plan below.   add on therapy: + 45 minutes in addition to 30 min coping, emo reg, cutting hx, rec self-worth. Reports feels better AEB stating feels better, positive statement and laughing. Receptive to support and reassurance. Denies SI.     PLAN:   problems treated   f/u requested to prevent relapse   medications renewed/re-ordered    Recommend OTC pain relief (Excedrin, Tylenol, etc) by mouth at 2 PM for prophylactic treatment.  Continue methylphenidate ER (Concerta) 18 mg by mouth every morning for ADHD. I have reviewed the report 5/15/25. I have considered the risk for abuse and diversion.   [facility] Agreement signed 9/18/2024  10/18/2024 Methylphenidate and drug screen completed   Continue lorazepam (Ativan) 0.5 mg by mouth daily as needed for anxiety  [facility] Agreement signed 9/18/2024  Drug screen completed 10/18/24    Following meds filled until 10/14/25  Continue Vitamin D3 1000 units by mouth dinner with food for vit d def. April 2024 vit D 27.7 (31-80).    Continue fluoxetine (Prozac) 40 mg by mouth daily for depression and anxiety.  Continue melatonin 9 mg by mouth at bedtime for sleep disturbances. 4 mg in the past not effective.  Continue trazodone 50 mg by mouth at bedtime, may take an additional 50 mg by mouth as needed for sleep.  Risks, benefits, alternatives, off-label uses, and side effects of medications have been discussed with patient/caregiver. There is no report of signs/symptoms consistent with medication-induced impairment in daily functioning. At this time, benefits of medication felt to outweigh potential risks. Will continue to reassess need for  psychotropic medication at regular intervals.  Return to clinic 1 month in person or earlier if needed. Call [phone] to reschedule.    Thank you for seeing me today. If you have any questions or concerns, do not hesitate to contact my office.  Leonor Barker  TREATMENT TYPE                                                                                     counseling and coordination of care; addressing signs and symptoms of illness; risks/benefits and side effects of medications; and behavioral approaches to illness.  This note was created using electronic dictation. There may be errors in syntax and meaning. Please contact the office with any questions.       PRESENT FOR APPOINTMENT  Client  Guardian/father Thien Leonard    F2F     SUBJECTIVE 25-year-old black male with a history of autism spectrum disorder, major depressive disorder, severe, without psychosis, generalized anxiety disorder, sleep disturbances, and past history of ADHD presenting for medication management.  History of present illness  Family history: - Mat great uncle  Pat second cousin  Parents: lives with   Siblings 3 brothers, 2 sisters- middle child, but youngest of boys  Birth no complications  Development: met milestones on time    DX ASD w/ID  1st grade DX ADHD  2017 ID level [id number]  School: [employer]- special Ed   Occupations None  Abuse: denies  Habits: plays [employer] games, walks  Nondenominational: no  Past psychiatric none; never been on meds- other than Melatonin 4 mg- not effective.  MEDICATIONS at time of initial evaluation:  OTC benedryl for allergies, melatonin 4 mg and Benadryl 25 mg for sleep.     Last seen May 16, 2025.  At that time, no medication changes.  March 2025. No changes.  February 2025.  At that time, no medication changes.  December 2024.  At that time, no medication changes.  November 2024.  At that time, no medication changes.  October 2024. At that time, no med changes.  Sept 2024, at that  "time Methylphenidate ER (Concerta) 18 mg by mouth every morning for ADHD  was started  and lorazepam (Ativan) 0.5 mg by mouth daily as needed for anxiety , Hydroxyzine HCL (Atarax) 50 mg was discontinued    August 2024.  no medication changes.    July 2024. At that time, Trazodone was increased and Vitamin D was started.  June 2024. At that time, no medication changes.   May 2024.  At that time, inpatient hospitalized medications were continued, in person.  Check and phone calls completed on 2 weeks as discussed.  April 2024 inpt med changes: fluoxetine increased from 20 mg to 40 mg; trazodone restarted (ct had stopped dt \"itchy eyes\"); Melatonin from 10 mg to 9 mg and hydroxyzine PRN started.  January 2024.  At that time, methylphenidate ER (Concerta) was started and trazodone was discontinued due to side effects of itchy eyes.  Methylphenidate DC in interim dt decrease in happiness.  In person November 2023. At that time, fluoxetine was increased and Trazodone was started.     Handled conflict with friend. Friend has Hindu persecutions.  +SI (4/20/25 ER visit & 1 x voiced in frustration) and 5/1/25 ER visit for hand laceration after drinking ETOH to \"make me feel better\" then getting angry and smashing glass.    Safety plan reviewed to tell someone/ call 911.     Oct 2024,  Jerry reports that he sprained his foot from jumping over the fence but feeling better.    SA Mishel Magdaleno      Reports that the methylphenidate is helping with concentration and impulse control  Reports that sleep is good with trazodone and melatonin.    Urine ( Methylphenidate) confirmation  and drug screen obtained 10/18/2024.      He is taking vitamin D daily and reports it seems to have helped his energy level.  He reports sleep is improved with the increase in trazodone: Falling asleep around 10 PM, may awaken at 2 AM, will look at the clock, but fall back asleep, until time to get up for program.  Very few naps after work.     He " "was inpatient hospitalized at [facility] 4/22/2024 for 3 days after being taken by police.  A passerby saw him on the bridge.  Positive suicidal ideation to jump.     They are also helping him get into [facility] to become a .     with the increase of Prozac he has had  \"bad thoughts\" are in the middle. Beginning of jan 2024 that he is not smart.  Last thought of the suicidal ideation approximately October 29, 2023.  Which is approximately 2 weeks after the start of Prozac.  The thoughts were that he felt like a failure and that he would be better off dead.  He missed therapy appointment with Ciara Macias in December 2023.  He still struggles with self-esteem.  Wants to go to college for . [employer]  He states he is afraid to ask for things because he is afraid to get something worse than the answer now.  Which is a lecture.  He has never received anything worse than a lecture, but this answer goes to show how he follows rules.  Father reports that his moods have improved by 50%.  No longer feels that his moods are like a \"roller coaster\".  Never displays anger or irritability.  Usually sadness or happy.  10/18/2023 REBA 7 score= 13/27; 11/29/2023 score 2/27  10/18/2023 PHQ-9 score= 14/27; 11/29/2023 score 9/27  However, he is taking melatonin 10 mg in alternating with Benadryl 25 mg and is still only getting 5 hours a night.  Appetite: good.  On questionnaire reported sometimes eats too little or too much.     Jerry states that his goal and goal of treatment is \"to be happy.\" Father reports the same. (Magic wand question).  He reports low self-esteem. Factors that would help him feel better: more productive (looking for work) and having more friends.     Psy/SI/HI/aggression: Denies A/VH, denies aggression. Denies manic/hypomanic s/s.  History: 2019 he was afraid to go places because he thought other people would think mean things about him.  + SI - thoughts " out of no where to hang self, Doesn't like self, never thought highly of self, doesn't think he is smart, no friends in school.  Approx August 2023, SA by hanging with jump rope- has cut rope up.  Supports: all family.  Coping: video games, editing, online friends.      Med Physicians:  PCP: Dr. Kinney   CCBDD [facility] Connie     MEDICAL REVIEW OF SYSTEMS:  GEN: denies fever, chills, night sweats  HEENT: denies changes in vision, eye pain, hearing changes, no symptoms of upper respiratory infection  CARDIO: denies chest pain and palpitations   RESP: +asthma  GI: denies nausea/vomiting/constipation/diarrhea, or blood in the stool  : denies burning/frequency/urgency, or bloody urine  NEURO: denies tremor, dizziness, numbness or tingling  MSK: denies muscle spasms or stiffness  DERM: denies new onset of rash  Med SE     COMPLIANCE: unknown     MMS:  ORIENTATION   alert  ambulatory  cooperative      BEHAVIOR:  enters easily  eye contact fair  gait normal  reciprocal interaction  spontaneous speech     MEMORY:  Appears intact     SENSORY :  Normal     COMMUNICATION:  conversational  Soft spoken     AFFECT:  normal/full     MOOD: depressed     THOUGHT PROCESS:  WNL     THOUGHT Content:  Denies SI     CONCENTRATION  Normal- maybe impaired dt past hx of ADHD     FUND OF KNOWLEDGE :  mild disability     JUDGEMENT: fair     EPS: N/A  AIMS negative     LABS REVIEWED:  04/2024- Vitamin D: 27.7, TSH: 6.740  EKG none available

## 2025-06-13 ENCOUNTER — APPOINTMENT (OUTPATIENT)
Dept: BEHAVIORAL HEALTH | Facility: CLINIC | Age: 25
End: 2025-06-13
Payer: COMMERCIAL

## 2025-06-13 DIAGNOSIS — F33.2 MDD (MAJOR DEPRESSIVE DISORDER), RECURRENT SEVERE, WITHOUT PSYCHOSIS (MULTI): ICD-10-CM

## 2025-06-13 DIAGNOSIS — F41.1 GAD (GENERALIZED ANXIETY DISORDER): ICD-10-CM

## 2025-06-13 DIAGNOSIS — F84.0 AUTISM (HHS-HCC): ICD-10-CM

## 2025-06-13 PROCEDURE — 90834 PSYTX W PT 45 MINUTES: CPT | Performed by: COUNSELOR
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
## 2025-07-02 ENCOUNTER — APPOINTMENT (OUTPATIENT)
Dept: BEHAVIORAL HEALTH | Facility: CLINIC | Age: 25
End: 2025-07-02
Payer: COMMERCIAL

## 2025-07-30 ENCOUNTER — APPOINTMENT (OUTPATIENT)
Dept: BEHAVIORAL HEALTH | Facility: CLINIC | Age: 25
End: 2025-07-30
Payer: COMMERCIAL

## 2025-08-13 ENCOUNTER — APPOINTMENT (OUTPATIENT)
Dept: BEHAVIORAL HEALTH | Facility: CLINIC | Age: 25
End: 2025-08-13
Payer: COMMERCIAL

## 2025-08-13 DIAGNOSIS — F32.2 CURRENT SEVERE EPISODE OF MAJOR DEPRESSIVE DISORDER WITHOUT PSYCHOTIC FEATURES WITHOUT PRIOR EPISODE (MULTI): Primary | ICD-10-CM

## 2025-08-13 DIAGNOSIS — F41.1 GAD (GENERALIZED ANXIETY DISORDER): ICD-10-CM

## 2025-08-13 DIAGNOSIS — F90.0 ATTENTION DEFICIT HYPERACTIVITY DISORDER (ADHD), PREDOMINANTLY INATTENTIVE TYPE: ICD-10-CM

## 2025-08-13 PROCEDURE — 99214 OFFICE O/P EST MOD 30 MIN: CPT | Performed by: NURSE PRACTITIONER

## 2025-08-13 RX ORDER — LORAZEPAM 0.5 MG/1
0.5 TABLET ORAL DAILY PRN
Qty: 30 TABLET | Refills: 2 | Status: SHIPPED | OUTPATIENT
Start: 2025-08-13

## 2025-08-13 RX ORDER — METHYLPHENIDATE HYDROCHLORIDE 18 MG/1
18 TABLET ORAL EVERY MORNING
Qty: 30 TABLET | Refills: 0 | Status: SHIPPED | OUTPATIENT
Start: 2025-09-12 | End: 2025-10-12

## 2025-08-13 RX ORDER — METHYLPHENIDATE HYDROCHLORIDE 18 MG/1
18 TABLET ORAL EVERY MORNING
Qty: 30 TABLET | Refills: 0 | Status: SHIPPED | OUTPATIENT
Start: 2025-08-13 | End: 2025-09-12

## 2025-08-13 RX ORDER — METHYLPHENIDATE HYDROCHLORIDE 18 MG/1
18 TABLET ORAL EVERY MORNING
Qty: 30 TABLET | Refills: 0 | Status: SHIPPED | OUTPATIENT
Start: 2025-10-12 | End: 2025-11-11

## 2025-08-13 ASSESSMENT — PATIENT HEALTH QUESTIONNAIRE - PHQ9
2. FEELING DOWN, DEPRESSED OR HOPELESS: MORE THAN HALF THE DAYS
8. MOVING OR SPEAKING SO SLOWLY THAT OTHER PEOPLE COULD HAVE NOTICED. OR THE OPPOSITE, BEING SO FIGETY OR RESTLESS THAT YOU HAVE BEEN MOVING AROUND A LOT MORE THAN USUAL: NOT AT ALL
4. FEELING TIRED OR HAVING LITTLE ENERGY: NOT AT ALL
9. THOUGHTS THAT YOU WOULD BE BETTER OFF DEAD, OR OF HURTING YOURSELF: NOT AT ALL
6. FEELING BAD ABOUT YOURSELF - OR THAT YOU ARE A FAILURE OR HAVE LET YOURSELF OR YOUR FAMILY DOWN: MORE THAN HALF THE DAYS
1. LITTLE INTEREST OR PLEASURE IN DOING THINGS: NOT AT ALL
3. TROUBLE FALLING OR STAYING ASLEEP OR SLEEPING TOO MUCH: NEARLY EVERY DAY
5. POOR APPETITE OR OVEREATING: NOT AT ALL
7. TROUBLE CONCENTRATING ON THINGS, SUCH AS READING THE NEWSPAPER OR WATCHING TELEVISION: NOT AT ALL

## 2025-08-13 ASSESSMENT — ANXIETY QUESTIONNAIRES
2. NOT BEING ABLE TO STOP OR CONTROL WORRYING: NOT AT ALL
5. BEING SO RESTLESS THAT IT IS HARD TO SIT STILL: NOT AT ALL
6. BECOMING EASILY ANNOYED OR IRRITABLE: NOT AT ALL
GAD7 TOTAL SCORE: 3
1. FEELING NERVOUS, ANXIOUS, OR ON EDGE: SEVERAL DAYS
7. FEELING AFRAID AS IF SOMETHING AWFUL MIGHT HAPPEN: SEVERAL DAYS
3. WORRYING TOO MUCH ABOUT DIFFERENT THINGS: SEVERAL DAYS
4. TROUBLE RELAXING: NOT AT ALL

## 2025-08-15 ENCOUNTER — APPOINTMENT (OUTPATIENT)
Dept: BEHAVIORAL HEALTH | Facility: CLINIC | Age: 25
End: 2025-08-15
Payer: COMMERCIAL

## 2025-08-15 DIAGNOSIS — F41.1 GAD (GENERALIZED ANXIETY DISORDER): ICD-10-CM

## 2025-08-15 DIAGNOSIS — F33.2 MDD (MAJOR DEPRESSIVE DISORDER), RECURRENT SEVERE, WITHOUT PSYCHOSIS (MULTI): ICD-10-CM

## 2025-08-15 DIAGNOSIS — F84.0 AUTISM (HHS-HCC): ICD-10-CM

## 2025-08-15 PROCEDURE — 90832 PSYTX W PT 30 MINUTES: CPT | Performed by: COUNSELOR

## 2025-08-27 ENCOUNTER — APPOINTMENT (OUTPATIENT)
Dept: BEHAVIORAL HEALTH | Facility: CLINIC | Age: 25
End: 2025-08-27
Payer: COMMERCIAL

## 2025-09-17 ENCOUNTER — APPOINTMENT (OUTPATIENT)
Dept: BEHAVIORAL HEALTH | Facility: CLINIC | Age: 25
End: 2025-09-17
Payer: COMMERCIAL

## 2025-09-24 ENCOUNTER — APPOINTMENT (OUTPATIENT)
Dept: BEHAVIORAL HEALTH | Facility: CLINIC | Age: 25
End: 2025-09-24
Payer: COMMERCIAL

## 2025-10-10 ENCOUNTER — APPOINTMENT (OUTPATIENT)
Dept: BEHAVIORAL HEALTH | Facility: CLINIC | Age: 25
End: 2025-10-10
Payer: COMMERCIAL

## 2025-10-29 ENCOUNTER — APPOINTMENT (OUTPATIENT)
Dept: BEHAVIORAL HEALTH | Facility: CLINIC | Age: 25
End: 2025-10-29
Payer: COMMERCIAL